# Patient Record
Sex: FEMALE | Race: WHITE | NOT HISPANIC OR LATINO | Employment: OTHER | URBAN - NONMETROPOLITAN AREA
[De-identification: names, ages, dates, MRNs, and addresses within clinical notes are randomized per-mention and may not be internally consistent; named-entity substitution may affect disease eponyms.]

---

## 2017-06-21 ENCOUNTER — COMMUNICATION - GICH (OUTPATIENT)
Dept: UROLOGY | Facility: OTHER | Age: 77
End: 2017-06-21

## 2017-07-19 ENCOUNTER — HISTORY (OUTPATIENT)
Dept: UROLOGY | Facility: OTHER | Age: 77
End: 2017-07-19

## 2017-07-19 ENCOUNTER — AMBULATORY - GICH (OUTPATIENT)
Dept: UROLOGY | Facility: OTHER | Age: 77
End: 2017-07-19

## 2017-07-19 DIAGNOSIS — Z85.51 PERSONAL HISTORY OF MALIGNANT NEOPLASM OF BLADDER: ICD-10-CM

## 2017-10-30 ENCOUNTER — OFFICE VISIT - GICH (OUTPATIENT)
Dept: FAMILY MEDICINE | Facility: OTHER | Age: 77
End: 2017-10-30

## 2017-10-30 ENCOUNTER — HISTORY (OUTPATIENT)
Dept: FAMILY MEDICINE | Facility: OTHER | Age: 77
End: 2017-10-30

## 2017-10-30 DIAGNOSIS — Z23 ENCOUNTER FOR IMMUNIZATION: ICD-10-CM

## 2017-10-30 DIAGNOSIS — Z12.31 ENCOUNTER FOR SCREENING MAMMOGRAM FOR MALIGNANT NEOPLASM OF BREAST: ICD-10-CM

## 2017-10-30 DIAGNOSIS — Z98.82 BREAST IMPLANT STATUS: ICD-10-CM

## 2017-10-30 DIAGNOSIS — Z79.899 OTHER LONG TERM (CURRENT) DRUG THERAPY: ICD-10-CM

## 2017-10-30 DIAGNOSIS — I10 ESSENTIAL (PRIMARY) HYPERTENSION: ICD-10-CM

## 2017-10-30 DIAGNOSIS — E78.00 PURE HYPERCHOLESTEROLEMIA: ICD-10-CM

## 2017-10-30 DIAGNOSIS — F41.8 OTHER SPECIFIED ANXIETY DISORDERS: ICD-10-CM

## 2017-10-30 LAB
A/G RATIO - HISTORICAL: 1.4 (ref 1–2)
ABSOLUTE BASOPHILS - HISTORICAL: 0 THOU/CU MM
ABSOLUTE EOSINOPHILS - HISTORICAL: 0.1 THOU/CU MM
ABSOLUTE IMMATURE GRANULOCYTES(METAS,MYELOS,PROS) - HISTORICAL: 0 THOU/CU MM
ABSOLUTE LYMPHOCYTES - HISTORICAL: 1.5 THOU/CU MM (ref 0.9–2.9)
ABSOLUTE MONOCYTES - HISTORICAL: 0.5 THOU/CU MM
ABSOLUTE NEUTROPHILS - HISTORICAL: 5.7 THOU/CU MM (ref 1.7–7)
ALBUMIN SERPL-MCNC: 4.3 G/DL (ref 3.5–5.7)
ALP SERPL-CCNC: 53 IU/L (ref 34–104)
ALT (SGPT) - HISTORICAL: 10 IU/L (ref 7–52)
ANION GAP - HISTORICAL: 9 (ref 5–18)
AST SERPL-CCNC: 18 IU/L (ref 13–39)
BASOPHILS # BLD AUTO: 0.3 %
BILIRUB SERPL-MCNC: 0.7 MG/DL (ref 0.3–1)
BUN SERPL-MCNC: 11 MG/DL (ref 7–25)
BUN/CREAT RATIO - HISTORICAL: 14
CALCIUM SERPL-MCNC: 9.5 MG/DL (ref 8.6–10.3)
CHLORIDE SERPLBLD-SCNC: 99 MMOL/L (ref 98–107)
CO2 SERPL-SCNC: 27 MMOL/L (ref 21–31)
CREAT SERPL-MCNC: 0.8 MG/DL (ref 0.7–1.3)
EOSINOPHIL NFR BLD AUTO: 1.4 %
ERYTHROCYTE [DISTWIDTH] IN BLOOD BY AUTOMATED COUNT: 14.2 % (ref 11.5–15.5)
GFR IF NOT AFRICAN AMERICAN - HISTORICAL: >60 ML/MIN/1.73M2
GLOBULIN - HISTORICAL: 3 G/DL (ref 2–3.7)
GLUCOSE SERPL-MCNC: 101 MG/DL (ref 70–105)
HCT VFR BLD AUTO: 46.3 % (ref 33–51)
HEMOGLOBIN: 15.1 G/DL (ref 12–16)
IMMATURE GRANULOCYTES(METAS,MYELOS,PROS) - HISTORICAL: 0.3 %
LYMPHOCYTES NFR BLD AUTO: 19.2 % (ref 20–44)
MCH RBC QN AUTO: 26.2 PG (ref 26–34)
MCHC RBC AUTO-ENTMCNC: 32.6 G/DL (ref 32–36)
MCV RBC AUTO: 80 FL (ref 80–100)
MONOCYTES NFR BLD AUTO: 6.7 %
NEUTROPHILS NFR BLD AUTO: 72.1 % (ref 42–72)
PLATELET # BLD AUTO: 191 THOU/CU MM (ref 140–440)
PMV BLD: 11.2 FL (ref 6.5–11)
POTASSIUM SERPL-SCNC: 4.4 MMOL/L (ref 3.5–5.1)
PROT SERPL-MCNC: 7.3 G/DL (ref 6.4–8.9)
RED BLOOD COUNT - HISTORICAL: 5.76 MIL/CU MM (ref 4–5.2)
SODIUM SERPL-SCNC: 135 MMOL/L (ref 133–143)
TSH - HISTORICAL: 2.68 UIU/ML (ref 0.34–5.6)
WHITE BLOOD COUNT - HISTORICAL: 7.9 THOU/CU MM (ref 4.5–11)

## 2017-12-28 NOTE — PATIENT INSTRUCTIONS
Patient Information     Patient Name MRN Annie Betancourt [id number] Female 1940      Patient Instructions by Roxane Terrell RN at 2017  1:00 PM     Author:  Roxane Terrell RN Service:  (none) Author Type:  NURS- Registered Nurse     Filed:  2017  1:14 PM Encounter Date:  2017 Status:  Signed     :  Roxane Terrell RN (NURS- Registered Nurse)            Home Care after Cystoscopy  Follow these guidelines for your care after your procedure.    Activity  No limitations    Bathing or showering  No limitations    Symptoms  You may notice some burning with urination but this usually resolves after 1-2 days.  You may also notice small amounts of blood in your urine.  Please increase water intake for the next few days to help with these symptoms.    Contacts  General Questions: [phone]  Appointments:  [phone]  Emergencies:  [phone]    When to call the clinic  If you develop any of the following symptoms please call the clinic immediately.  If the clinic is closed please be seen at an urgent care clinic or the Emergency Department.  - Burning with urination that worsens after 2 days  - Unable to urinate causing severe pelvic pain  - Fevers of greater than 101 degrees F  - Flank pain that is not responding to pain medication    Follow up  Please follow up as discussed at the appointment.

## 2017-12-28 NOTE — PROGRESS NOTES
Patient Information     Patient Name MRN Sex Annie Ram [id number] Female 1940      Progress Notes by Florentin Cordoba MD at 2017  1:00 PM     Author:  Florentin Cordoba MD Service:  (none) Author Type:  Physician     Filed:  2017  1:44 PM Encounter Date:  2017 Status:  Signed     :  Florentin Cordoba MD (Physician)            Preprocedure diagnosis  History of bladder cancer    Postprocedure diagnosis  History of bladder cancer    Procedure  Flexible Cystourethroscopy    Surgeon  Florentin Cordoba MD    Anesthesia  2% lidocaine jelly intraurethrally    Complications  None    Indications  76 y.o. female undergoing a flexible cystoscopy for the above mentioned indications.    Findings  Cystoscopic findings included a normal urethra.    The bladder appeared to be normal capacity.    There were no tumors, stones or foreign bodies.    The orifices were slit-shaped and in their normal location.    Procedure  The patient was placed in supine position and prepped and draped in sterile fashion with lidocaine jelly per urethra for anesthesia.    I passed a lubricated 14F flexible cystoscope through the urethra and into the bladder and the bladder was completely visualized.  The cystoscope was retroflexed and the bladder neck visualized.    The cystoscope was slowly withdrawn while visualizing the urethra and the procedure terminated.    The patient tolerated the procedure well.      Pathology  I personally reviewed the pathology report  2015  low grade Ta (muscularis propria was not present in specimen)    Plan  Follow up surveillance cystoscopy in 6 months then once annually

## 2017-12-28 NOTE — TELEPHONE ENCOUNTER
Patient Information     Patient Name MRN Annie Betancourt [id number] Female 1940      Telephone Encounter by Roxane Terrell RN at 2017 12:54 PM     Author:  Roxane Terrell RN Service:  (none) Author Type:  NURS- Registered Nurse     Filed:  2017 12:55 PM Encounter Date:  2017 Status:  Signed     :  Roxane Terrell RN (NURS- Registered Nurse)            Patient states she wasn't sure who called her.  I am thinking she may have got a reminder call.  Patient states that she is exhausted and cannot come to her 1:00 appointment for her cystoscopy.  Patient transferred to the Urology  to reschedule.  Roxane Terrell RN.........2017...12:55 PM

## 2017-12-29 NOTE — PATIENT INSTRUCTIONS
Patient Information     Patient Name MRN Annie Betancourt [id number] Female 1940      Patient Instructions by Rosalina Clancy MD at 10/30/2017  2:09 PM     Author:  Rosalina Clancy MD  Service:  (none) Author Type:  Physician     Filed:  10/30/2017  2:10 PM  Encounter Date:  10/30/2017 Status:  Addendum     :  Rosalina Clancy MD (Physician)        Related Notes: Original Note by Rosalina Clancy MD (Physician) filed at 10/30/2017  2:09 PM            [employer]: Tips and Coping Skills for Quitting Smoking     Getting Started           Make a list of reasons for quitting.           Think positively.  -   Believe you can.  -   Remind yourself,  I don t do that anymore.   -   Tell yourself often:  I can do this.   -   Visualize yourself as someone who doesn t use tobacco.           Use relaxation breathing.  -   Inhale to count of eight.  -   Hold to count of four.  -   Exhale to count of eight.           Substitute items for cigarettes.  -   Chew gum.  -   Suck on hard candy.  -   Chew on straws or toothpicks.  -   Eat low-calorie snacks.           Keep your hands busy.  -   Play cards.  -   Read books.  -   Put together puzzles.  -   Play with rubber binders.  -   Make crafts.  -   Write letters.  -   Draw.  -   Paint.           Concentrate on the good things in your life!           Change your environment:  -   Change your routine to help avoid temptation. Even small changes can lower the craving to smoke.  -   Get rid of all cigarettes, ashtrays and lighters in your home, car, desk or office.  -   Change your favorite smoking areas to make them remind you less of smoking.  -   Make your home and vehicles smoke free.           Get support from others:  -   Talk to your family, friends or coworkers about how to support you while you quit.  -   See if others you know would like to quit with you. This way you can support each other through the tougher times of quitting.           Plan  your reward for each day you don t smoke.           Remember that even the most intense craving lasts only 5 to 10 minutes. Wait it out. Tell yourself,  This too shall pass.   Avoiding a Relapse           Have a plan for how you will deal with unexpected urges. (Take a walk, make a call.)           Think your way through difficult situations ahead of time whenever you can.           Think about past quitting attempts and what was helpful to you. Reuse them again if possible or try something new.           Explore ways to move your body with safe and realistic expectations. Increasing your physical activity can help you manage weight gain and work through emotions that otherwise would make you want to smoke.           Avoid foods high in calories and fat. Sugar can increase cravings to smoke.  Limit large amounts of sugar.           Drink lots of water. Ice water may be helpful in getting rid of a craving.           Reward yourself when you reach milestones: 1 day, 1 week, 2 weeks, 1 month, etc.           Go to places where you cannot smoke -- stay away from the places you used to smoke.           Think about the money you saved!           Think of quitting as an act of love -- for those you care about and for yourself!  Quitting Smoking  For help to quit smoking:           Call [employer] Class Registration at [phone] or visit [email or web address] to learn about support groups.           Call [employer] at [phone] or visit [email or web address].       2016 [employer]. TM - A TRADEMARK OF [employer]  OTHER TRADEMARKS USED ARE OWNED BY THEIR RESPECTIVE OWNERS  THIS FACT SHEET DOES NOT REPLACE MEDICAL OR PROFESSIONAL ADVICE; IT IS ONLY A GUIDE.  [id number] (2/16)

## 2017-12-30 NOTE — NURSING NOTE
Patient Information     Patient Name MRN Annie Betancourt [id number] Female 1940      Nursing Note by Roxane Terrell RN at 2017  1:00 PM     Author:  Roxane Terrell RN Service:  (none) Author Type:  NURS- Registered Nurse     Filed:  2017  1:36 PM Encounter Date:  2017 Status:  Signed     :  Roxane Terrell RN (NURS- Registered Nurse)            Patient positioned in frog leg position prior to Florentin Cordoba MD prepping patient with chlorhexidine gluconate cleanser.    [place] Protocol    A. Pre-procedure verification complete yes  1-relevant information / documentation available, reviewed and properly matched to the patient; 2-consent accurate and complete, 3-equipment and supplies available    B. Site marking complete N/A  Site marked if not in continuous attendance with patient    C. TIME OUT completed yes  Time Out was conducted just prior to starting procedure to verify the eight required elements: 1-patient identity, 2-consent accurate and complete, 3-position, 4-correct side/site marked (if applicable), 5-procedure, 6-relevant images / results properly labeled and displayed (if applicable), 7-antibiotics / irrigation fluids (if applicable), 8-safety precautions.    After procedure perineum area rinsed. Discharge instructions reviewed with patient. Patient verbalized understanding of discharge instructions and discharged ambulatory.

## 2018-01-23 ENCOUNTER — DOCUMENTATION ONLY (OUTPATIENT)
Dept: FAMILY MEDICINE | Facility: OTHER | Age: 78
End: 2018-01-23

## 2018-01-23 PROBLEM — Z98.82 H/O BREAST IMPLANT: Status: ACTIVE | Noted: 2017-10-30

## 2018-01-23 PROBLEM — F41.1 GENERALIZED ANXIETY DISORDER: Status: ACTIVE | Noted: 2018-01-23

## 2018-01-23 RX ORDER — HYDROCHLOROTHIAZIDE 12.5 MG/1
12.5 CAPSULE ORAL DAILY
COMMUNITY
Start: 2017-10-30 | End: 2019-01-15

## 2018-01-23 RX ORDER — ASPIRIN 81 MG/1
81 TABLET ORAL
COMMUNITY
Start: 2012-10-29

## 2018-01-23 RX ORDER — METOPROLOL TARTRATE 50 MG
TABLET ORAL
COMMUNITY
Start: 2017-10-30 | End: 2018-12-22

## 2018-01-23 RX ORDER — LISINOPRIL 20 MG/1
20 TABLET ORAL DAILY
COMMUNITY
Start: 2017-10-30 | End: 2018-04-04 | Stop reason: DRUGHIGH

## 2018-01-23 RX ORDER — ESCITALOPRAM OXALATE 10 MG/1
10 TABLET ORAL DAILY
COMMUNITY
Start: 2017-10-30 | End: 2019-01-10

## 2018-01-23 RX ORDER — SIMVASTATIN 20 MG
20 TABLET ORAL AT BEDTIME
COMMUNITY
Start: 2017-10-30 | End: 2018-12-29

## 2018-01-26 VITALS
HEART RATE: 62 BPM | SYSTOLIC BLOOD PRESSURE: 179 MMHG | WEIGHT: 163.6 LBS | DIASTOLIC BLOOD PRESSURE: 103 MMHG | BODY MASS INDEX: 26.29 KG/M2 | HEIGHT: 66 IN

## 2018-01-26 VITALS — HEART RATE: 64 BPM | BODY MASS INDEX: 26.1 KG/M2 | WEIGHT: 162.4 LBS | HEIGHT: 66 IN | RESPIRATION RATE: 16 BRPM

## 2018-01-29 ENCOUNTER — HISTORY (OUTPATIENT)
Dept: UROLOGY | Facility: OTHER | Age: 78
End: 2018-01-29

## 2018-01-29 ENCOUNTER — AMBULATORY - GICH (OUTPATIENT)
Dept: UROLOGY | Facility: OTHER | Age: 78
End: 2018-01-29

## 2018-01-29 DIAGNOSIS — Z85.51 PERSONAL HISTORY OF MALIGNANT NEOPLASM OF BLADDER: ICD-10-CM

## 2018-02-09 VITALS — WEIGHT: 165.6 LBS | RESPIRATION RATE: 16 BRPM | HEART RATE: 60 BPM | BODY MASS INDEX: 27.59 KG/M2 | HEIGHT: 65 IN

## 2018-02-13 NOTE — NURSING NOTE
Patient Information     Patient Name MRN Sex Annie Ram [id number] Female 1940      Nursing Note by Yasmine Rosen RN at 2018  2:00 PM     Author:  Yasmine Rosen RN Service:  (none) Author Type:  NURS- Registered Nurse     Filed:  2018  2:18 PM Encounter Date:  2018 Status:  Signed     :  Yasmine Rosen RN (NURS- Registered Nurse)            Patient positioned in frog leg position prior to Florentin Cordoba MD prepping patient with chlorhexidine gluconate cleanser.    [place] Protocol    A. Pre-procedure verification complete yes  1-relevant information / documentation available, reviewed and properly matched to the patient; 2-consent accurate and complete, 3-equipment and supplies available    B. Site marking complete N/A  Site marked if not in continuous attendance with patient    C. TIME OUT completed yes  Time Out was conducted just prior to starting procedure to verify the eight required elements: 1-patient identity, 2-consent accurate and complete, 3-position, 4-correct side/site marked (if applicable), 5-procedure, 6-relevant images / results properly labeled and displayed (if applicable), 7-antibiotics / irrigation fluids (if applicable), 8-safety precautions.    After procedure perineum area rinsed. Discharge instructions reviewed with patient. Patient verbalized understanding of discharge instructions and discharged ambulatory.

## 2018-02-13 NOTE — PATIENT INSTRUCTIONS
Patient Information     Patient Name MRN Annie Betancourt [id number] Female 1940      Patient Instructions by Yasmine Rosen RN at 2018  2:00 PM     Author:  Yasmine Rosen RN Service:  (none) Author Type:  NURS- Registered Nurse     Filed:  2018  2:18 PM Encounter Date:  2018 Status:  Signed     :  Yasmine Rosen RN (NURS- Registered Nurse)            Home Care after Cystoscopy  Follow these guidelines for your care after your procedure.    Activity  No limitations    Bathing or showering  No limitations    Symptoms  You may notice some burning with urination but this usually resolves after 1-2 days.  You may also notice small amounts of blood in your urine.  Please increase water intake for the next few days to help with these symptoms.    Contacts  General Questions: [phone]  Appointments:  [phone]  Emergencies:  911    When to call the clinic  If you develop any of the following symptoms please call the clinic immediately.  If the clinic is closed please be seen at an urgent care clinic or the Emergency Department.  - Burning with urination that worsens after 2 days  - Unable to urinate causing severe pelvic pain  - Fevers of greater than 101 degrees F  - Flank pain that is not responding to pain medication    Follow up  Please follow up as discussed at the appointment.

## 2018-04-03 ENCOUNTER — NURSE TRIAGE (OUTPATIENT)
Dept: FAMILY MEDICINE | Facility: OTHER | Age: 78
End: 2018-04-03

## 2018-04-03 NOTE — TELEPHONE ENCOUNTER
"Pt calls today regarding increased BP.  Pt states BP was taken this morning and it read 179/99 and 155/93.  Pt complains of light headedness and a bit of dizziness.  Pt denies any chest pain or difficulty breathing.    Per face to face with  pt is to check bp again tonight.    I instructed pt to call back in the morning with readings.  We scheduled an appointment tomorrow with .  Pt was instructed to call back with worsening symptoms or call 911 if chest pain or difficulty breathing occurred.  Pt stated understanding and intent to comply.  Will route to self to follow up in AM.  Anusha Velez RN.............................4/3/2018 12:39 PM    Reason for Disposition    BP  >= 180/110    Answer Assessment - Initial Assessment Questions  1. BLOOD PRESSURE: \"What is the blood pressure?\" \"Did you take at least two measurements 5 minutes apart?\"      179/99 155/93  2. ONSET: \"When did you take your blood pressure?\"      This morning  3. HOW: \"How did you obtain the blood pressure?\" (e.g., visiting nurse, automatic home BP monitor)      Automatic from home  4. HISTORY: \"Do you have a history of high blood pressure?\"      Have had the last couple yeras  5. MEDICATIONS: \"Are you taking any medications for blood pressure?\" \"Have you missed any doses recently?\"      No missed doeses  6. OTHER SYMPTOMS: \"Do you have any symptoms?\" (e.g., headache, chest pain, blurred vision, difficulty breathing, weakness)      Light headed, dizzy, a little bit of weakness  7. PREGNANCY: \"Is there any chance you are pregnant?\" \"When was your last menstrual period?\"      NA    Protocols used: HIGH BLOOD PRESSURE-ADULT-AH    "

## 2018-04-04 ENCOUNTER — OFFICE VISIT (OUTPATIENT)
Dept: FAMILY MEDICINE | Facility: OTHER | Age: 78
End: 2018-04-04
Attending: FAMILY MEDICINE
Payer: COMMERCIAL

## 2018-04-04 VITALS
DIASTOLIC BLOOD PRESSURE: 92 MMHG | BODY MASS INDEX: 27.62 KG/M2 | SYSTOLIC BLOOD PRESSURE: 169 MMHG | WEIGHT: 166 LBS | HEART RATE: 49 BPM

## 2018-04-04 DIAGNOSIS — I10 ESSENTIAL HYPERTENSION: Primary | ICD-10-CM

## 2018-04-04 PROCEDURE — G0463 HOSPITAL OUTPT CLINIC VISIT: HCPCS

## 2018-04-04 PROCEDURE — 99213 OFFICE O/P EST LOW 20 MIN: CPT | Performed by: FAMILY MEDICINE

## 2018-04-04 RX ORDER — LISINOPRIL 40 MG/1
40 TABLET ORAL DAILY
Qty: 90 TABLET | Refills: 3 | Status: SHIPPED | OUTPATIENT
Start: 2018-04-04 | End: 2019-01-01

## 2018-04-04 NOTE — PROGRESS NOTES
"Subjective:   Annie Joy is a 77 year old female with hypertension and had not been taking Blood pressure regularly and then yesterday felt \"dizzy\" so checked and it was high so called for an appointment. Still smoking 1 PPD. Hardly leaves the house in the winter.       Current Outpatient Prescriptions   Medication Sig Dispense Refill     lisinopril (PRINIVIL/ZESTRIL) 40 MG tablet Take 1 tablet (40 mg) by mouth daily 90 tablet 3     aspirin EC 81 MG EC tablet Take 81 mg by mouth daily with food       CALCIUM PO Take 600 mg by mouth daily       escitalopram (LEXAPRO) 10 MG tablet Take 10 mg by mouth daily       hydrochlorothiazide (MICROZIDE) 12.5 MG capsule Take 12.5 mg by mouth daily       metoprolol tartrate (LOPRESSOR) 50 MG tablet Take 2 tablets in am and 2 in the evening.       simvastatin (ZOCOR) 20 MG tablet Take 20 mg by mouth At Bedtime       [DISCONTINUED] lisinopril (PRINIVIL/ZESTRIL) 20 MG tablet Take 20 mg by mouth daily        Social History     Social History     Marital status:      Spouse name: Pardeep     Number of children: N/A     Years of education: N/A     Occupational History     Not on file.     Social History Main Topics     Smoking status: Current Every Day Smoker     Packs/day: 1.00     Years: 60.00     Types: Cigarettes     Smokeless tobacco: Never Used     Alcohol use No     Drug use: Not on file      Comment: Drug use: No     Sexual activity: Not Currently     Other Topics Concern     Not on file     Social History Narrative     for 50 years 2011  Children: Jasvir Najera  1957, Soraida De La Rosa, Edie Vieira, 1961, [place](adopted out and then found her 2002), Ambrosio Joy 1964, rizwana Ta, Enoc Joy  1966, [facility] long term for drug sale and possession      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        Enjoys visiting, photography, painting, writing.    Lives with her . He has had stroke but still drives.       Hypertension ROS: taking " medications as instructed, no medication side effects noted, no TIA's, no chest pain on exertion, no dyspnea on exertion, no swelling of ankles.   New concerns: See above.     Objective:   BP (!) 169/92 (BP Location: Right arm, Patient Position: Sitting)  Pulse (!) 49  Wt 166 lb (75.3 kg)  BMI 27.62 kg/m2   Appearance healthy, alert and cooperative.  Marked stigmata of her chronic tobacco use with wrinkling skin and gravelly voice.  She smells of cigarettes.  General exam BP noted to be moderately elevated today in office, S1, S2 normal, no gallop, no murmur, chest clear, no JVD, no HSM, no edema.   Lab review: labs are reviewed, up to date and normal.     Assessment:    Hypertension needs improvement.     Plan:   Reviewed all meds as she seem to have some confusion about the names of medications and what they were for.  I printed out her med list and we reviewed all of these individually.  Increased lisinopril to 40 mg daily.  We will plan to see her back in follow-up if blood pressures are not coming under control at home.  She does have a cuff and does check it intermittently.  Increased activity and try to get out and walk once the weather gets better since she has been very sedentary.  She also has been a little bit more liberal in salt intake recently.  Rosalina Clancy MD  4:06 PM 4/4/2018   Portions of this dictation were created using the Dragon Nuance voice recognition system. Proofreading was completed but there may be errors in text.

## 2018-04-04 NOTE — NURSING NOTE
Patient presents to clinic with high blood pressure, has been checking at home.  Shelby Koch LPN ...... 4/4/2018 3:03 PM

## 2018-04-04 NOTE — MR AVS SNAPSHOT
"              After Visit Summary   4/4/2018    Annie Joy    MRN: [id number]           Patient Information     Date Of Birth          1940        Visit Information        Provider Department      4/4/2018 3:00 PM Rosalina Clancy MD [facility]        Today's Diagnoses     Essential hypertension    -  1      Care Instructions      Step-by-Step  Checking Your Blood Pressure    Date Last Reviewed: 4/27/2016 2000-2017 [employer]. [address] [place] [address]. All rights reserved. This information is not intended as a substitute for professional medical care. Always follow your healthcare professional's instructions.                Follow-ups after your visit        Who to contact     If you have questions or need follow up information about today's clinic visit or your schedule please contact [facility] directly at [phone].  Normal or non-critical lab and imaging results will be communicated to you by MyChart, letter or phone within 4 business days after the clinic has received the results. If you do not hear from us within 7 days, please contact the clinic through [employer] or phone. If you have a critical or abnormal lab result, we will notify you by phone as soon as possible.  Submit refill requests through [employer] or call your pharmacy and they will forward the refill request to us. Please allow 3 business days for your refill to be completed.          Additional Information About Your Visit        MyChart Information     [employer] lets you send messages to your doctor, view your test results, renew your prescriptions, schedule appointments and more. To sign up, go to [email or web address] . Click on \"Log in\" on the left side of the screen, which will take you to the Welcome page. Then click on \"Sign up Now\" on the right side of the page.     You will be asked to enter the access code listed below, as well as some personal " information. Please follow the directions to create your username and password.     Your access code is: [email or web address]  Expires: 7/3/2018  3:26 PM     Your access code will  in 90 days. If you need help or a new code, please call your [place] clinic or [phone].        Care EveryWhere ID     This is your Care EveryWhere ID. This could be used by other organizations to access your [place] medical records  [id number]        Your Vitals Were     Pulse BMI (Body Mass Index)                58 27.62 kg/m2           Blood Pressure from Last 3 Encounters:   18 (!) 190/112   10/30/17 (!) 179/103   16 154/68    Weight from Last 3 Encounters:   18 166 lb (75.3 kg)   18 165 lb 9.6 oz (75.1 kg)   10/30/17 163 lb 9.6 oz (74.2 kg)              Today, you had the following     No orders found for display         Today's Medication Changes          These changes are accurate as of 18  3:26 PM.  If you have any questions, ask your nurse or doctor.               These medicines have changed or have updated prescriptions.        Dose/Directions    lisinopril 40 MG tablet   Commonly known as:  PRINIVIL/ZESTRIL   This may have changed:    - medication strength  - how much to take   Used for:  Essential hypertension   Changed by:  Rosalina Clancy MD        Dose:  40 mg   Take 1 tablet (40 mg) by mouth daily   Quantity:  90 tablet   Refills:  3            Where to get your medicines      These medications were sent to [facility] [address] [address]     Phone:  [phone]     lisinopril 40 MG tablet                Primary Care Provider Office Phone # Fax #    Rosalina Clancy [phone]       [address] [facility] [address]        Equal Access to Services     LALITO DE LA CRUZ AH: Robby quiroz Soraghav, waaxda luqadaha, qaybta kaalwilder pierce, jocelynn jha  laanamaria abel. So [facility] [phone].    ATENCIÓN: Si habla miguel a, tiene a franks disposición servicios gratuitos de asistencia lingüística. Tom al [phone].    We comply with applicable federal civil rights laws and [place] laws. We do not discriminate on the basis of race, color, national origin, age, disability, sex, sexual orientation, or gender identity.            Thank you!     Thank you for choosing [facility] AND [facility]  for your care. Our goal is always to provide you with excellent care. Hearing back from our patients is one way we can continue to improve our services. Please take a few minutes to complete the written survey that you may receive in the mail after your visit with us. Thank you!             Your Updated Medication List - Protect others around you: Learn how to safely use, store and throw away your medicines at www.disposemymeds.org.          This list is accurate as of 4/4/18  3:26 PM.  Always use your most recent med list.                   Brand Name Dispense Instructions for use Diagnosis    aspirin EC 81 MG EC tablet      Take 81 mg by mouth daily with food        CALCIUM PO      Take 600 mg by mouth daily        escitalopram 10 MG tablet    LEXAPRO     Take 10 mg by mouth daily        hydrochlorothiazide 12.5 MG capsule    MICROZIDE     Take 12.5 mg by mouth daily        lisinopril 40 MG tablet    PRINIVIL/ZESTRIL    90 tablet    Take 1 tablet (40 mg) by mouth daily    Essential hypertension       metoprolol tartrate 50 MG tablet    LOPRESSOR     Take 2 tablets in am and 2 in the evening.        simvastatin 20 MG tablet    ZOCOR     Take 20 mg by mouth At Bedtime

## 2018-04-04 NOTE — PATIENT INSTRUCTIONS
Step-by-Step  Checking Your Blood Pressure    Date Last Reviewed: 4/27/2016 2000-2017 [employer]. [address], [place] [address]. All rights reserved. This information is not intended as a substitute for professional medical care. Always follow your healthcare professional's instructions.

## 2018-07-24 NOTE — PROGRESS NOTES
Patient Information     Patient Name  Annie Joy MRN  [id number] Sex  Female   1940      Letter by Rosalina Clancy MD at      Author:  Rosalina Clancy MD Service:  (none) Author Type:  (none)    Filed:   Encounter Date:  10/30/2017 Status:  (Other)           Annie Joy  [address] [facility] [address]          2017    Dear Ms. Joy:    Following are the tests completed during your last clinic visit.  The results of these tests are normal and require no further attention.  Results for orders placed or performed in visit on 10/30/17      COMPLETE METABOLIC PANEL      Result  Value Ref Range    SODIUM 135 133 - 143 mmol/L    POTASSIUM 4.4 3.5 - 5.1 mmol/L    CHLORIDE 99 98 - 107 mmol/L    CO2,TOTAL 27 21 - 31 mmol/L    ANION GAP 9 5 - 18                    GLUCOSE 101 70 - 105 mg/dL    CALCIUM 9.5 8.6 - 10.3 mg/dL    BUN 11 7 - 25 mg/dL    CREATININE 0.80 0.70 - 1.30 mg/dL    BUN/CREAT RATIO           14                    GFR if African American >60 >60 ml/min/1.73m2    GFR if not African American >60 >60 ml/min/1.73m2    ALBUMIN 4.3 3.5 - 5.7 g/dL    PROTEIN,TOTAL 7.3 6.4 - 8.9 g/dL    GLOBULIN                  3.0 2.0 - 3.7 g/dL    A/G RATIO 1.4 1.0 - 2.0                    BILIRUBIN,TOTAL 0.7 0.3 - 1.0 mg/dL    ALK PHOSPHATASE 53 34 - 104 IU/L    ALT (SGPT) 10 7 - 52 IU/L    AST (SGOT) 18 13 - 39 IU/L   TSH      Result  Value Ref Range    TSH 2.68 0.34 - 5.60 uIU/mL   CBC WITH AUTO DIFFERENTIAL      Result  Value Ref Range    WHITE BLOOD COUNT         7.9 4.5 - 11.0 thou/cu mm    RED BLOOD COUNT           5.76 (H) 4.00 - 5.20 mil/cu mm    HEMOGLOBIN                15.1 12.0 - 16.0 g/dL    HEMATOCRIT                46.3 33.0 - 51.0 %    MCV                       80 80 - 100 fL    MCH                       26.2 26.0 - 34.0 pg    MCHC                      32.6 32.0 - 36.0 g/dL    RDW                       14.2 11.5 - 15.5 %    PLATELET COUNT            191 140 - 440 thou/cu  mm    MPV                       11.2 (H) 6.5 - 11.0 fL    NEUTROPHILS               72.1 (H) 42.0 - 72.0 %    LYMPHOCYTES               19.2 (L) 20.0 - 44.0 %    MONOCYTES                 6.7 <12.0 %    EOSINOPHILS               1.4 <8.0 %    BASOPHILS                 0.3 <3.0 %    IMMATURE GRANULOCYTES(METAS,MYELOS,PROS) 0.3 %    ABSOLUTE NEUTROPHILS      5.7 1.7 - 7.0 thou/cu mm    ABSOLUTE LYMPHOCYTES      1.5 0.9 - 2.9 thou/cu mm    ABSOLUTE MONOCYTES        0.5 <0.9 thou/cu mm    ABSOLUTE EOSINOPHILS      0.1 <0.5 thou/cu mm    ABSOLUTE BASOPHILS        0.0 <0.3 thou/cu mm    ABSOLUTE IMMATURE GRANULOCYTES(METAS,MYELOS,PROS) 0.0 <=0.3 thou/cu mm     If you have any further questions or problems contact my office at  [phone].  Sincerely,    Rosalina Clancy MD  9:44 AM 10/31/2017

## 2018-12-29 DIAGNOSIS — E78.00 PURE HYPERCHOLESTEROLEMIA: Primary | ICD-10-CM

## 2019-01-01 ENCOUNTER — OFFICE VISIT (OUTPATIENT)
Dept: FAMILY MEDICINE | Facility: OTHER | Age: 79
End: 2019-01-01
Attending: FAMILY MEDICINE
Payer: COMMERCIAL

## 2019-01-01 ENCOUNTER — OFFICE VISIT (OUTPATIENT)
Dept: UROLOGY | Facility: OTHER | Age: 79
End: 2019-01-01
Attending: UROLOGY
Payer: MEDICARE

## 2019-01-01 VITALS
HEIGHT: 67 IN | BODY MASS INDEX: 21.56 KG/M2 | TEMPERATURE: 98.1 F | RESPIRATION RATE: 12 BRPM | HEART RATE: 56 BPM | WEIGHT: 137.4 LBS | DIASTOLIC BLOOD PRESSURE: 80 MMHG | SYSTOLIC BLOOD PRESSURE: 142 MMHG

## 2019-01-01 VITALS — HEART RATE: 64 BPM | WEIGHT: 142.4 LBS | BODY MASS INDEX: 23.7 KG/M2 | RESPIRATION RATE: 12 BRPM

## 2019-01-01 DIAGNOSIS — I10 ESSENTIAL HYPERTENSION: ICD-10-CM

## 2019-01-01 DIAGNOSIS — E78.00 PURE HYPERCHOLESTEROLEMIA: ICD-10-CM

## 2019-01-01 DIAGNOSIS — I10 ESSENTIAL HYPERTENSION: Primary | ICD-10-CM

## 2019-01-01 DIAGNOSIS — Z85.51 HISTORY OF BLADDER CANCER: Primary | ICD-10-CM

## 2019-01-01 DIAGNOSIS — Z79.899 MEDICATION MANAGEMENT: Primary | ICD-10-CM

## 2019-01-01 DIAGNOSIS — F41.8 DEPRESSION WITH ANXIETY: ICD-10-CM

## 2019-01-01 LAB
ALBUMIN SERPL-MCNC: 4.7 G/DL (ref 3.5–5.7)
ALP SERPL-CCNC: 57 U/L (ref 34–104)
ALT SERPL W P-5'-P-CCNC: 8 U/L (ref 7–52)
ANION GAP SERPL CALCULATED.3IONS-SCNC: 8 MMOL/L (ref 3–14)
AST SERPL W P-5'-P-CCNC: 19 U/L (ref 13–39)
BASOPHILS # BLD AUTO: 0 10E9/L (ref 0–0.2)
BASOPHILS NFR BLD AUTO: 0.4 %
BILIRUB SERPL-MCNC: 0.9 MG/DL (ref 0.3–1)
BUN SERPL-MCNC: 7 MG/DL (ref 7–25)
CALCIUM SERPL-MCNC: 9.6 MG/DL (ref 8.6–10.3)
CHLORIDE SERPL-SCNC: 94 MMOL/L (ref 98–107)
CO2 SERPL-SCNC: 29 MMOL/L (ref 21–31)
CREAT SERPL-MCNC: 0.78 MG/DL (ref 0.6–1.2)
DIFFERENTIAL METHOD BLD: ABNORMAL
EOSINOPHIL # BLD AUTO: 0 10E9/L (ref 0–0.7)
EOSINOPHIL NFR BLD AUTO: 0.6 %
ERYTHROCYTE [DISTWIDTH] IN BLOOD BY AUTOMATED COUNT: 13.8 % (ref 10–15)
GFR SERPL CREATININE-BSD FRML MDRD: 71 ML/MIN/{1.73_M2}
GLUCOSE SERPL-MCNC: 116 MG/DL (ref 70–105)
HCT VFR BLD AUTO: 46 % (ref 35–47)
HGB BLD-MCNC: 15.1 G/DL (ref 11.7–15.7)
IMM GRANULOCYTES # BLD: 0 10E9/L (ref 0–0.4)
IMM GRANULOCYTES NFR BLD: 0.3 %
LYMPHOCYTES # BLD AUTO: 1.3 10E9/L (ref 0.8–5.3)
LYMPHOCYTES NFR BLD AUTO: 19.2 %
MCH RBC QN AUTO: 26.3 PG (ref 26.5–33)
MCHC RBC AUTO-ENTMCNC: 32.8 G/DL (ref 31.5–36.5)
MCV RBC AUTO: 80 FL (ref 78–100)
MONOCYTES # BLD AUTO: 0.5 10E9/L (ref 0–1.3)
MONOCYTES NFR BLD AUTO: 7.9 %
NEUTROPHILS # BLD AUTO: 4.9 10E9/L (ref 1.6–8.3)
NEUTROPHILS NFR BLD AUTO: 71.6 %
PLATELET # BLD AUTO: 184 10E9/L (ref 150–450)
POTASSIUM SERPL-SCNC: 3.5 MMOL/L (ref 3.5–5.1)
PROT SERPL-MCNC: 7 G/DL (ref 6.4–8.9)
RBC # BLD AUTO: 5.74 10E12/L (ref 3.8–5.2)
SODIUM SERPL-SCNC: 131 MMOL/L (ref 134–144)
WBC # BLD AUTO: 6.8 10E9/L (ref 4–11)

## 2019-01-01 PROCEDURE — 36415 COLL VENOUS BLD VENIPUNCTURE: CPT | Mod: ZL | Performed by: FAMILY MEDICINE

## 2019-01-01 PROCEDURE — 80053 COMPREHEN METABOLIC PANEL: CPT | Mod: ZL | Performed by: FAMILY MEDICINE

## 2019-01-01 PROCEDURE — 85025 COMPLETE CBC W/AUTO DIFF WBC: CPT | Mod: ZL | Performed by: FAMILY MEDICINE

## 2019-01-01 PROCEDURE — G0463 HOSPITAL OUTPT CLINIC VISIT: HCPCS | Mod: 25

## 2019-01-01 PROCEDURE — G0463 HOSPITAL OUTPT CLINIC VISIT: HCPCS

## 2019-01-01 PROCEDURE — 52000 CYSTOURETHROSCOPY: CPT | Performed by: UROLOGY

## 2019-01-01 PROCEDURE — G0439 PPPS, SUBSEQ VISIT: HCPCS | Performed by: FAMILY MEDICINE

## 2019-01-01 RX ORDER — SIMVASTATIN 20 MG
20 TABLET ORAL AT BEDTIME
Qty: 90 TABLET | Refills: 4 | Status: SHIPPED | OUTPATIENT
Start: 2019-01-01

## 2019-01-01 RX ORDER — METOPROLOL TARTRATE 50 MG
TABLET ORAL
Refills: 0 | OUTPATIENT
Start: 2019-01-01

## 2019-01-01 RX ORDER — LISINOPRIL 40 MG/1
TABLET ORAL
Qty: 90 TABLET | Refills: 0 | Status: SHIPPED | OUTPATIENT
Start: 2019-01-01 | End: 2019-01-01

## 2019-01-01 RX ORDER — ESCITALOPRAM OXALATE 10 MG/1
10 TABLET ORAL DAILY
Qty: 90 TABLET | Refills: 4 | Status: SHIPPED | OUTPATIENT
Start: 2019-01-01

## 2019-01-01 RX ORDER — SIMVASTATIN 20 MG
TABLET ORAL
Qty: 30 TABLET | Refills: 0 | Status: SHIPPED | OUTPATIENT
Start: 2019-01-01 | End: 2019-01-01

## 2019-01-01 RX ORDER — HYDROCHLOROTHIAZIDE 12.5 MG/1
CAPSULE ORAL
Qty: 90 CAPSULE | Refills: 4 | Status: SHIPPED | OUTPATIENT
Start: 2019-01-01

## 2019-01-01 RX ORDER — METOPROLOL TARTRATE 100 MG
100 TABLET ORAL 2 TIMES DAILY
Qty: 180 TABLET | Refills: 3 | Status: ON HOLD | OUTPATIENT
Start: 2019-01-01 | End: 2020-01-01

## 2019-01-01 RX ORDER — LISINOPRIL 40 MG/1
TABLET ORAL
Qty: 30 TABLET | Refills: 0 | Status: SHIPPED | OUTPATIENT
Start: 2019-01-01 | End: 2019-01-01

## 2019-01-01 RX ORDER — LISINOPRIL 40 MG/1
40 TABLET ORAL DAILY
Qty: 90 TABLET | Refills: 4 | Status: SHIPPED | OUTPATIENT
Start: 2019-01-01

## 2019-01-01 RX ORDER — SIMVASTATIN 20 MG
TABLET ORAL
Qty: 90 TABLET | Refills: 0 | Status: SHIPPED | OUTPATIENT
Start: 2019-01-01 | End: 2019-01-01

## 2019-01-01 ASSESSMENT — MIFFLIN-ST. JEOR: SCORE: 1130.87

## 2019-01-01 ASSESSMENT — PAIN SCALES - GENERAL: PAINLEVEL: NO PAIN (0)

## 2019-01-03 RX ORDER — SIMVASTATIN 20 MG
TABLET ORAL
Qty: 90 TABLET | Refills: 0 | Status: SHIPPED | OUTPATIENT
Start: 2019-01-03 | End: 2019-01-01

## 2019-01-03 NOTE — TELEPHONE ENCOUNTER
"Refill request from [facility] for:  simvastatin (ZOCOR) 20 MG tablet      LOV with PCP 4/4/2018  Medications reviewed and no changes indicated to requested medication as per visit note. Lab result letter out 3/18/2016 also indicated no changes to requested medication.    Will refill for 90 days    Requested Prescriptions   Pending Prescriptions Disp Refills     simvastatin (ZOCOR) 20 MG tablet [Pharmacy Med Name: SIMVASTATIN 20MG  TAB] 90 tablet 4     Sig: TAKE ONE TABLET BY MOUTH AT BEDTIME    Statins Protocol Failed - 12/29/2018 12:15 PM       Failed - LDL on file in past 12 months    Recent Labs   Lab Test 03/18/16  1444   *            Passed - No abnormal creatine kinase in past 12 months    No lab results found.            Passed - Recent (12 mo) or future (30 days) visit within the authorizing provider's specialty    Patient had office visit in the last 12 months or has a visit in the next 30 days with authorizing provider or within the authorizing provider's specialty.  See \"Patient Info\" tab in inbasket, or \"Choose Columns\" in Meds & Orders section of the refill encounter.             Passed - Patient is age 18 or older       Passed - No active pregnancy on record       Passed - No positive pregnancy test in past 12 months      Medication Detail      Disp Refills Start End MIGUELANGEL   simvastatin (ZOCOR) 20 mg tablet 90 tablet 4 10/30/2017  No   Sig: Take 1 tablet by mouth at bedtime.   Sent to pharmacy as: simvastatin 20 mg tablet   Class: eRx   Route: Oral   E-Prescribing Status: Receipt confirmed by pharmacy (10/30/2017  2:08 PM CDT)   Associated Diagnoses     Pure hypercholesterolemia         Bibi Das RN  ....................  1/3/2019   11:44 AM      "

## 2019-01-10 DIAGNOSIS — F41.8 DEPRESSION WITH ANXIETY: Primary | ICD-10-CM

## 2019-01-11 RX ORDER — ESCITALOPRAM OXALATE 10 MG/1
TABLET ORAL
Qty: 90 TABLET | Refills: 3 | Status: SHIPPED | OUTPATIENT
Start: 2019-01-11 | End: 2019-01-01

## 2019-01-11 NOTE — TELEPHONE ENCOUNTER
Prescription approved per [facility] Refill Protocol.  Yasmine Mueller RN on 1/11/2019 at 3:14 PM

## 2019-03-25 NOTE — PROGRESS NOTES
Preprocedure diagnosis  History of bladder cancer    Postprocedure diagnosis  History of bladder cancer    Procedure  Flexible Cystourethroscopy    Surgeon  Florentin Cordoba MD    Anesthesia  2% lidocaine jelly intraurethrally    Complications  None    Indications  The patient is undergoing a flexible cystoscopy for the above mentioned indications.    Findings  Cystoscopic findings included a normal urethra.    The bladder appeared to be normal capacity.    There were no tumors, stones or foreign bodies.    The orifices were slit-shaped and in their normal location.    Procedure  The patient was placed in supine position and prepped and draped in sterile fashion with lidocaine jelly per urethra for anesthesia.    I passed a lubricated 14F flexible cystoscope through the urethra and into the bladder and the bladder was completely visualized.  The cystoscope was retroflexed and the bladder neck visualized.    The cystoscope was slowly withdrawn while visualizing the urethra and the procedure terminated.    The patient tolerated the procedure well.      Pathology  I personally reviewed the pathology report  11/4/2015  low grade Ta (muscularis propria was not present in specimen)    Plan  Follow up surveillance cystoscopy once annually

## 2019-03-25 NOTE — NURSING NOTE
Patient positioned in frog leg position prior to Florentin Cordoba MD prepping patient with chlorhexidine gluconate cleanser.    [place] Protocol    A. Pre-procedure verification complete Yes   1-relevant information / documentation available, reviewed and properly matched to the patient; 2-consent accurate and complete, 3-equipment and supplies available    B. Site marking complete N/A  Site marked if not in continuous attendance with patient    C. TIME OUT completed Yes  Time Out was conducted just prior to starting procedure to verify the eight required elements: 1-patient identity, 2-consent accurate and complete, 3-position, 4-correct side/site marked (if applicable), 5-procedure, 6-relevant images / results properly labeled and displayed (if applicable), 7-antibiotics / irrigation fluids (if applicable), 8-safety precautions.    After procedure perineum area rinsed. Discharge instructions reviewed with patient. Patient verbalized understanding of discharge instructions and discharged ambulatory.  Roxane Terrell..................3/25/2019  1:50 PM

## 2019-03-25 NOTE — PATIENT INSTRUCTIONS

## 2019-03-30 NOTE — LETTER
April 2, 2019      Annie Joy  [address] [facility] [address]        Dear Annie,     A refill of simvastatin (ZOCOR) 20 MG tablet has been requested by your pharmacy.  We noticed that it has been12 months since your last comprehensive visit and labs with Rosalina Clancy MD.  A limited 90 day supply has been sent to your pharmacy at this time.    Additional refills require a medication management appointment.  Your health is very important to us.  Please call the clinic at [phone] to schedule your appointment.    Thank you,    The Refill Nurse  [facility]

## 2019-04-02 NOTE — TELEPHONE ENCOUNTER
"Refill request from [facility] for:  simvastatin (ZOCOR) 20 MG tablet  Last filled for 90 day supply 1/3/2019    LOV 4/4/2018 with PCP   No changes noted to requested medication    Pt due for appt  No upcoming appt noted.      Will provide evelin refill, send letter, and add note to pharmacy.    Requested Prescriptions   Pending Prescriptions Disp Refills     simvastatin (ZOCOR) 20 MG tablet [Pharmacy Med Name: SIMVASTATIN 20MG  TAB] 90 tablet 0     Sig: TAKE 1 TABLET BY MOUTH AT BEDTIME    Statins Protocol Failed - 4/2/2019  2:54 PM       Failed - LDL on file in past 12 months    Recent Labs   Lab Test 03/18/16  1444   *            Passed - No abnormal creatine kinase in past 12 months    No lab results found.            Passed - Recent (12 mo) or future (30 days) visit within the authorizing provider's specialty    Patient had office visit in the last 12 months or has a visit in the next 30 days with authorizing provider or within the authorizing provider's specialty.  See \"Patient Info\" tab in inbasket, or \"Choose Columns\" in Meds & Orders section of the refill encounter.             Passed - Medication is active on med list       Passed - Patient is age 18 or older       Passed - No active pregnancy on record       Passed - No positive pregnancy test in past 12 months        Bibi Das RN  ....................  4/2/2019   3:01 PM      "

## 2019-04-09 NOTE — TELEPHONE ENCOUNTER
[facility] sent Rx request for the following:      LISINOPRIL 40MG     TAB   Sig: TAKE 1 TABLET BY MOUTH ONCE DAILY  Last Prescription Date:   4/4/18  Last Fill Qty/Refills:         90, R-3    Last Office Visit:              4/4/18  Future Office visit:           None.    ACE Inhibitors (Including Combos) Protocol Failed4/9 4:07 PM   Blood pressure under 140/90 in past 12 months    Recent (12 mo) or future (30 days) visit within the authorizing provider's specialty    Normal serum creatinine on file in past 12 months    Normal serum potassium on file in past 12 months     Patient due for annual exam and labs. Reminder letter sent to Pt on 4/2/19. Prescription approved per [facility] Refill Protocol for 90 day evelin refill. Yasmine Rosen RN .............. 4/9/2019  4:09 PM

## 2019-07-03 NOTE — TELEPHONE ENCOUNTER
"Refill request from [facility] for:  simvastatin (ZOCOR) 20 MG tablet    LOV 4/4/2018 with PCP     Medication filled for limited supply and letter sent 4/2/2019    No upcoming appt at this time.    Contacted patient and she states she would like to schedule an appointment but has a difficult time getting in to appts as she cares for her .  Will provide 30 day evelin refill.  Pt given clinic phone number and will call back to schedule med management appt.    Requested Prescriptions   Pending Prescriptions Disp Refills     simvastatin (ZOCOR) 20 MG tablet [Pharmacy Med Name: SIMVASTATIN 20MG  TAB] 90 tablet 0     Sig: TAKE 1 TABLET BY MOUTH AT BEDTIME       Statins Protocol Failed - 6/29/2019  3:05 PM        Failed - LDL on file in past 12 months     Recent Labs   Lab Test 03/18/16  1444   *             Failed - Recent (12 mo) or future (30 days) visit within the authorizing provider's specialty     Patient had office visit in the last 12 months or has a visit in the next 30 days with authorizing provider or within the authorizing provider's specialty.  See \"Patient Info\" tab in inbasket, or \"Choose Columns\" in Meds & Orders section of the refill encounter.              Passed - No abnormal creatine kinase in past 12 months     No lab results found.             Passed - Medication is active on med list        Passed - Patient is age 18 or older        Passed - No active pregnancy on record        Passed - No positive pregnancy test in past 12 months        Bibi Das RN  ....................  7/3/2019   4:40 PM      "

## 2019-07-10 NOTE — TELEPHONE ENCOUNTER
Refill request for: Lisinopril 40 mg tablets   From: [facility]   Rx written date: 04/09/2019 #90 with 0 refills  LOV: 04/04/2018 with PCP  Next appt: none noted  Protocol:  ACE Inhibitors (Including Combos) Protocol Failed   Blood pressure under 140/90 in past 12 months    Recent (12 mo) or future (30 days) visit within the authorizing provider's specialty    Normal serum creatinine on file in past 12 months    Normal serum potassium on file in past 12 months       Pt due for annual exam and labs. Reminder letter sent on 04/02/2019. Call to pt who is agreeable to scheduling appt for annual f/u. Soft transfer to scheduling line. Pt now scheduled for 07/18/2019 with PCP. Will provide limited refill to last until pt can be seen. Kirsty Olsen RN on 7/10/2019 at 2:19 PM

## 2019-07-18 NOTE — PROGRESS NOTES
SUBJECTIVE:   Annie Joy is a 78 year old female who presents for Preventive Visit.    Are you in the first 12 months of your Medicare coverage?  No    HPI  Do you feel safe in your environment? Yes    Do you have a Health Care Directive? No: Advance care planning was reviewed with patient; patient declined at this time.      Fall risk  Fallen 2 or more times in the past year?: No  Any fall with injury in the past year?: No    Cognitive Screening   1) Repeat 3 items (Leader, Season, Table)    2) Clock draw: NORMAL  3) 3 item recall: Recalls 2 objects   Results: NORMAL clock, 1-2 items recalled: COGNITIVE IMPAIRMENT LESS LIKELY    Mini-CogTM Copyright S Sang. Licensed by the author for use in [facility]; reprinted with permission (soob@[facility]). All rights reserved.      Do you have sleep apnea, excessive snoring or daytime drowsiness?: no    Reviewed and updated as needed this visit by clinical staff  Tobacco  Allergies  Soc Hx        Reviewed and updated as needed this visit by Provider        Social History     Tobacco Use     Smoking status: Current Every Day Smoker     Packs/day: 1.00     Years: 60.00     Pack years: 60.00     Types: Cigarettes     Smokeless tobacco: Never Used   Substance Use Topics     Alcohol use: No     Alcohol/week: 0.0 oz       Patient Care Team:  Rosalina Clancy MD as PCP - General (Family Practice)  Rosalina Clancy MD as Assigned PCP    The following health maintenance items are reviewed in Epic and correct as of today:  Health Maintenance   Topic Date Due     DEXA  1940     SPIROMETRY  1940     COPD ACTION PLAN  1940     DEPRESSION ACTION PLAN  1940     MEDICARE ANNUAL WELLNESS VISIT  09/29/2004     ZOSTER IMMUNIZATION (2 of 3) 02/11/2010     INFLUENZA VACCINE (1) 09/01/2019     PHQ-9  01/18/2020     FALL RISK ASSESSMENT  07/18/2020     LIPID  03/18/2021     ADVANCE CARE PLANNING  04/04/2023     DTAP/TDAP/TD IMMUNIZATION (4 - Td)  "07/30/2025     IPV IMMUNIZATION  Aged Out     MENINGITIS IMMUNIZATION  Aged Out       Review of Systems  Constitutional, HEENT, cardiovascular, pulmonary, gi and gu systems are negative, except as otherwise noted.    OBJECTIVE:   /80   Pulse 56   Temp 98.1  F (36.7  C)   Resp 12   Ht 1.702 m (5' 7\")   Wt 62.3 kg (137 lb 6.4 oz)   Breastfeeding? No   BMI 21.52 kg/m   Estimated body mass index is 21.52 kg/m  as calculated from the following:    Height as of this encounter: 1.702 m (5' 7\").    Weight as of this encounter: 62.3 kg (137 lb 6.4 oz).  Physical Exam   Constitutional: She appears well-developed and well-nourished. No distress.   Smells strongly of tobacco.   Cardiovascular: Normal rate and regular rhythm.   Pulmonary/Chest: Effort normal.   Long expiratory phase.   Neurological: She is alert.   Psychiatric: She has a normal mood and affect. Her behavior is normal.   Nursing note and vitals reviewed.      Diagnostic Test Results: Ending see results.    ASSESSMENT / PLAN:       ICD-10-CM    1. Medication management Z79.899    2. Depression with anxiety F41.8 escitalopram (LEXAPRO) 10 MG tablet   3. Essential hypertension I10 hydrochlorothiazide (MICROZIDE) 12.5 MG capsule     lisinopril (PRINIVIL/ZESTRIL) 40 MG tablet     Comprehensive Metabolic Panel     CBC and Differential     CBC and Differential     Comprehensive Metabolic Panel   4. Pure hypercholesterolemia E78.00 simvastatin (ZOCOR) 20 MG tablet       End of Life Planning:  Patient currently has an advanced directive: No.  I have verified the patient's ablity to prepare an advanced directive/make health care decisions.  Literature was provided to assist patient in preparing an advanced directive.    COUNSELING:  Reviewed preventive health counseling, as reflected in patient instructions       Smoking cessation declined    Estimated body mass index is 21.52 kg/m  as calculated from the following:    Height as of this encounter: 1.702 m (5' " "7\").    Weight as of this encounter: 62.3 kg (137 lb 6.4 oz).    Plan:  Continue current medications and refills provided.  Smoking cessation is always addressed with this patient and she has no intentions of quitting.  She continues at 1 pack/day as does her spouse.  Immunizations up-to-date and declines any other health maintenance issues such as mammography.  Follow-up annually and at her discretion.  Rosalina Clancy MD  3:01 PM 7/18/2019   Portions of this dictation were created using the Dragon Nuance voice recognition system. Proofreading was completed but there may be errors in text.              "

## 2019-07-18 NOTE — NURSING NOTE
Patient presents to clinic for medicare wellness visit.  Marybeth Kline ....................  7/18/2019   2:10 PM

## 2019-07-18 NOTE — LETTER
July 23, 2019      Annie Joy  [address] [address]        Dear ,       Here are the labs done at your recent clinic visit.  None of these are of concern.  Continue on your medications as you have been.    Resulted Orders   CBC and Differential   Result Value Ref Range    WBC 6.8 4.0 - 11.0 10e9/L    RBC Count 5.74 (H) 3.8 - 5.2 10e12/L    Hemoglobin 15.1 11.7 - 15.7 g/dL    Hematocrit 46.0 35.0 - 47.0 %    MCV 80 78 - 100 fl    MCH 26.3 (L) 26.5 - 33.0 pg    MCHC 32.8 31.5 - 36.5 g/dL    RDW 13.8 10.0 - 15.0 %    Platelet Count 184 150 - 450 10e9/L    Diff Method Automated Method     % Neutrophils 71.6 %    % Lymphocytes 19.2 %    % Monocytes 7.9 %    % Eosinophils 0.6 %    % Basophils 0.4 %    % Immature Granulocytes 0.3 %    Absolute Neutrophil 4.9 1.6 - 8.3 10e9/L    Absolute Lymphocytes 1.3 0.8 - 5.3 10e9/L    Absolute Monocytes 0.5 0.0 - 1.3 10e9/L    Absolute Eosinophils 0.0 0.0 - 0.7 10e9/L    Absolute Basophils 0.0 0.0 - 0.2 10e9/L    Abs Immature Granulocytes 0.0 0 - 0.4 10e9/L   Comprehensive Metabolic Panel   Result Value Ref Range    Sodium 131 (L) 134 - 144 mmol/L    Potassium 3.5 3.5 - 5.1 mmol/L    Chloride 94 (L) 98 - 107 mmol/L    Carbon Dioxide 29 21 - 31 mmol/L    Anion Gap 8 3 - 14 mmol/L    Glucose 116 (H) 70 - 105 mg/dL    Urea Nitrogen 7 7 - 25 mg/dL    Creatinine 0.78 0.60 - 1.20 mg/dL    GFR Estimate 71 >60 mL/min/[1.73_m2]    GFR Estimate If Black 86 >60 mL/min/[1.73_m2]    Calcium 9.6 8.6 - 10.3 mg/dL    Bilirubin Total 0.9 0.3 - 1.0 mg/dL    Albumin 4.7 3.5 - 5.7 g/dL    Protein Total 7.0 6.4 - 8.9 g/dL    Alkaline Phosphatase 57 34 - 104 U/L    ALT 8 7 - 52 U/L    AST 19 13 - 39 U/L       If you have any questions or concerns, please call the clinic at the number listed above.       Sincerely,        Rosalina Clancy MD

## 2019-12-19 NOTE — TELEPHONE ENCOUNTER
Metoprolol Tartrate 50 MG Oral Tablet   Last Written Prescription Date:  12/24/2018  Last Fill Quantity: 360,   # refills: 4  Last Office Visit: 7/18/2019  Future Office visit:       Routing refill request to provider for review/approval because:  Blood pressure out of range  142/80 and has been seen 7/18/2019 with this medication not renewed. Will forward to  provider due to Dr. Clancy not in clinic anymore. Unable to complete prescription refill per RNMedication Refill Policy.................... Yessenia Waite RN ....................  12/19/2019   10:59 AM

## 2020-01-01 ENCOUNTER — HOSPITAL ENCOUNTER (INPATIENT)
Facility: OTHER | Age: 80
LOS: 2 days | DRG: 065 | End: 2020-01-31
Attending: INTERNAL MEDICINE | Admitting: INTERNAL MEDICINE
Payer: MEDICARE

## 2020-01-01 ENCOUNTER — APPOINTMENT (OUTPATIENT)
Dept: GENERAL RADIOLOGY | Facility: OTHER | Age: 80
DRG: 065 | End: 2020-01-01
Attending: EMERGENCY MEDICINE
Payer: MEDICARE

## 2020-01-01 ENCOUNTER — APPOINTMENT (OUTPATIENT)
Dept: CT IMAGING | Facility: OTHER | Age: 80
DRG: 065 | End: 2020-01-01
Attending: EMERGENCY MEDICINE
Payer: MEDICARE

## 2020-01-01 VITALS
SYSTOLIC BLOOD PRESSURE: 153 MMHG | HEART RATE: 56 BPM | RESPIRATION RATE: 32 BRPM | TEMPERATURE: 101.5 F | DIASTOLIC BLOOD PRESSURE: 84 MMHG | OXYGEN SATURATION: 96 %

## 2020-01-01 DIAGNOSIS — I62.9 INTRACRANIAL HEMORRHAGE (H): ICD-10-CM

## 2020-01-01 DIAGNOSIS — I63.9 STROKE (H): ICD-10-CM

## 2020-01-01 DIAGNOSIS — F17.210 NICOTINE DEPENDENCE, CIGARETTES, UNCOMPLICATED: ICD-10-CM

## 2020-01-01 DIAGNOSIS — Z79.899 ENCOUNTER FOR LONG-TERM (CURRENT) USE OF OTHER MEDICATIONS: ICD-10-CM

## 2020-01-01 DIAGNOSIS — Z79.82 ENCOUNTER FOR LONG-TERM (CURRENT) USE OF ASPIRIN: ICD-10-CM

## 2020-01-01 DIAGNOSIS — I21.9 ACUTE MYOCARDIAL INFARCTION, UNSPECIFIED MI TYPE, UNSPECIFIED ARTERY (H): ICD-10-CM

## 2020-01-01 DIAGNOSIS — I10 ESSENTIAL (PRIMARY) HYPERTENSION: ICD-10-CM

## 2020-01-01 DIAGNOSIS — I21.9 ACUTE MYOCARDIAL INFARCTION, INITIAL EPISODE OF CARE (H): ICD-10-CM

## 2020-01-01 LAB
ALBUMIN SERPL-MCNC: 4.6 G/DL (ref 3.5–5.7)
ALP SERPL-CCNC: 59 U/L (ref 34–104)
ALT SERPL W P-5'-P-CCNC: 39 U/L (ref 7–52)
ANION GAP SERPL CALCULATED.3IONS-SCNC: 14 MMOL/L (ref 3–14)
APTT PPP: 27 SEC (ref 22–37)
AST SERPL W P-5'-P-CCNC: 41 U/L (ref 13–39)
BASOPHILS # BLD AUTO: 0 10E9/L (ref 0–0.2)
BASOPHILS NFR BLD AUTO: 0.1 %
BILIRUB SERPL-MCNC: 0.9 MG/DL (ref 0.3–1)
BUN SERPL-MCNC: 10 MG/DL (ref 7–25)
CALCIUM SERPL-MCNC: 9 MG/DL (ref 8.6–10.3)
CHLORIDE SERPL-SCNC: 91 MMOL/L (ref 98–107)
CO2 SERPL-SCNC: 22 MMOL/L (ref 21–31)
CREAT SERPL-MCNC: 0.61 MG/DL (ref 0.6–1.2)
DIFFERENTIAL METHOD BLD: ABNORMAL
EOSINOPHIL # BLD AUTO: 0 10E9/L (ref 0–0.7)
EOSINOPHIL NFR BLD AUTO: 0 %
ERYTHROCYTE [DISTWIDTH] IN BLOOD BY AUTOMATED COUNT: 13.6 % (ref 10–15)
GFR SERPL CREATININE-BSD FRML MDRD: >90 ML/MIN/{1.73_M2}
GLUCOSE SERPL-MCNC: 223 MG/DL (ref 70–105)
HCT VFR BLD AUTO: 44.8 % (ref 35–47)
HGB BLD-MCNC: 14.9 G/DL (ref 11.7–15.7)
IMM GRANULOCYTES # BLD: 0.1 10E9/L (ref 0–0.4)
IMM GRANULOCYTES NFR BLD: 0.4 %
INR PPP: 1.16 (ref 0–1.3)
INTERPRETATION ECG - MUSE: NORMAL
LACTATE SERPL-SCNC: 3 MMOL/L (ref 0.5–2.2)
LYMPHOCYTES # BLD AUTO: 0.6 10E9/L (ref 0.8–5.3)
LYMPHOCYTES NFR BLD AUTO: 3.4 %
MCH RBC QN AUTO: 26.1 PG (ref 26.5–33)
MCHC RBC AUTO-ENTMCNC: 33.3 G/DL (ref 31.5–36.5)
MCV RBC AUTO: 79 FL (ref 78–100)
MONOCYTES # BLD AUTO: 1 10E9/L (ref 0–1.3)
MONOCYTES NFR BLD AUTO: 5.7 %
NEUTROPHILS # BLD AUTO: 15.4 10E9/L (ref 1.6–8.3)
NEUTROPHILS NFR BLD AUTO: 90.4 %
PLATELET # BLD AUTO: 172 10E9/L (ref 150–450)
POTASSIUM SERPL-SCNC: 2.7 MMOL/L (ref 3.5–5.1)
PROT SERPL-MCNC: 7.2 G/DL (ref 6.4–8.9)
RADIOLOGIST FLAGS: ABNORMAL
RBC # BLD AUTO: 5.7 10E12/L (ref 3.8–5.2)
SODIUM SERPL-SCNC: 127 MMOL/L (ref 134–144)
TROPONIN I SERPL-MCNC: 1057.7 PG/ML
WBC # BLD AUTO: 17 10E9/L (ref 4–11)

## 2020-01-01 PROCEDURE — 36415 COLL VENOUS BLD VENIPUNCTURE: CPT | Performed by: EMERGENCY MEDICINE

## 2020-01-01 PROCEDURE — 80053 COMPREHEN METABOLIC PANEL: CPT | Performed by: EMERGENCY MEDICINE

## 2020-01-01 PROCEDURE — 12000000 ZZH R&B MED SURG/OB

## 2020-01-01 PROCEDURE — 85025 COMPLETE CBC W/AUTO DIFF WBC: CPT | Performed by: EMERGENCY MEDICINE

## 2020-01-01 PROCEDURE — 25000128 H RX IP 250 OP 636: Performed by: INTERNAL MEDICINE

## 2020-01-01 PROCEDURE — 40000275 ZZH STATISTIC RCP TIME EA 10 MIN

## 2020-01-01 PROCEDURE — 93010 ELECTROCARDIOGRAM REPORT: CPT | Performed by: INTERNAL MEDICINE

## 2020-01-01 PROCEDURE — 99291 CRITICAL CARE FIRST HOUR: CPT | Mod: Z6 | Performed by: EMERGENCY MEDICINE

## 2020-01-01 PROCEDURE — 99291 CRITICAL CARE FIRST HOUR: CPT | Mod: 25 | Performed by: EMERGENCY MEDICINE

## 2020-01-01 PROCEDURE — 25000125 ZZHC RX 250: Performed by: INTERNAL MEDICINE

## 2020-01-01 PROCEDURE — 71045 X-RAY EXAM CHEST 1 VIEW: CPT

## 2020-01-01 PROCEDURE — 93005 ELECTROCARDIOGRAM TRACING: CPT | Performed by: EMERGENCY MEDICINE

## 2020-01-01 PROCEDURE — 70450 CT HEAD/BRAIN W/O DYE: CPT

## 2020-01-01 PROCEDURE — 36600 WITHDRAWAL OF ARTERIAL BLOOD: CPT | Performed by: EMERGENCY MEDICINE

## 2020-01-01 PROCEDURE — 84484 ASSAY OF TROPONIN QUANT: CPT | Performed by: EMERGENCY MEDICINE

## 2020-01-01 PROCEDURE — 83605 ASSAY OF LACTIC ACID: CPT | Performed by: EMERGENCY MEDICINE

## 2020-01-01 PROCEDURE — 99231 SBSQ HOSP IP/OBS SF/LOW 25: CPT | Performed by: INTERNAL MEDICINE

## 2020-01-01 PROCEDURE — 85610 PROTHROMBIN TIME: CPT | Performed by: EMERGENCY MEDICINE

## 2020-01-01 PROCEDURE — 85730 THROMBOPLASTIN TIME PARTIAL: CPT | Performed by: EMERGENCY MEDICINE

## 2020-01-01 PROCEDURE — 25000132 ZZH RX MED GY IP 250 OP 250 PS 637: Performed by: INTERNAL MEDICINE

## 2020-01-01 PROCEDURE — 99222 1ST HOSP IP/OBS MODERATE 55: CPT | Mod: AI | Performed by: INTERNAL MEDICINE

## 2020-01-01 RX ORDER — LORAZEPAM 2 MG/ML
.5-1 INJECTION INTRAMUSCULAR
Status: DISCONTINUED | OUTPATIENT
Start: 2020-01-01 | End: 2020-01-31 | Stop reason: HOSPADM

## 2020-01-01 RX ORDER — MORPHINE SULFATE 100 MG/5ML
5-10 SOLUTION ORAL
Status: DISCONTINUED | OUTPATIENT
Start: 2020-01-01 | End: 2020-01-31 | Stop reason: HOSPADM

## 2020-01-01 RX ORDER — ATROPINE SULFATE 10 MG/ML
1-2 SOLUTION/ DROPS OPHTHALMIC
Status: DISCONTINUED | OUTPATIENT
Start: 2020-01-01 | End: 2020-01-31 | Stop reason: HOSPADM

## 2020-01-01 RX ORDER — MORPHINE SULFATE 2 MG/ML
1-2 INJECTION, SOLUTION INTRAMUSCULAR; INTRAVENOUS
Status: DISCONTINUED | OUTPATIENT
Start: 2020-01-01 | End: 2020-01-31 | Stop reason: HOSPADM

## 2020-01-01 RX ORDER — LORAZEPAM 0.5 MG/1
.5-1 TABLET ORAL
Status: DISCONTINUED | OUTPATIENT
Start: 2020-01-01 | End: 2020-01-31 | Stop reason: HOSPADM

## 2020-01-01 RX ORDER — ONDANSETRON 4 MG/1
4 TABLET, ORALLY DISINTEGRATING ORAL EVERY 6 HOURS PRN
Status: DISCONTINUED | OUTPATIENT
Start: 2020-01-01 | End: 2020-01-31 | Stop reason: HOSPADM

## 2020-01-01 RX ORDER — ONDANSETRON 2 MG/ML
4 INJECTION INTRAMUSCULAR; INTRAVENOUS EVERY 6 HOURS PRN
Status: DISCONTINUED | OUTPATIENT
Start: 2020-01-01 | End: 2020-01-31 | Stop reason: HOSPADM

## 2020-01-01 RX ADMIN — MORPHINE SULFATE 10 MG: 20 SOLUTION ORAL at 12:29

## 2020-01-01 RX ADMIN — ATROPINE SULFATE 2 DROP: 10 SOLUTION OPHTHALMIC at 05:29

## 2020-01-01 RX ADMIN — ATROPINE SULFATE 2 DROP: 10 SOLUTION OPHTHALMIC at 14:31

## 2020-01-01 RX ADMIN — MORPHINE SULFATE 5 MG: 20 SOLUTION ORAL at 09:42

## 2020-01-01 RX ADMIN — MORPHINE SULFATE 10 MG: 20 SOLUTION ORAL at 14:26

## 2020-01-01 RX ADMIN — MORPHINE SULFATE 10 MG: 20 SOLUTION ORAL at 16:36

## 2020-01-01 RX ADMIN — ATROPINE SULFATE 2 DROP: 10 SOLUTION OPHTHALMIC at 03:45

## 2020-01-01 RX ADMIN — MORPHINE SULFATE 1 MG: 2 INJECTION, SOLUTION INTRAMUSCULAR; INTRAVENOUS at 11:05

## 2020-01-01 RX ADMIN — ATROPINE SULFATE 2 DROP: 10 SOLUTION OPHTHALMIC at 08:54

## 2020-01-01 ASSESSMENT — ACTIVITIES OF DAILY LIVING (ADL)
RETIRED_EATING: 0-->INDEPENDENT
FALL_HISTORY_WITHIN_LAST_SIX_MONTHS: NO
COGNITION: 1 - ATTENTION OR MEMORY DEFICITS
TOILETING: 0-->INDEPENDENT
TRANSFERRING: 0-->INDEPENDENT
ADLS_ACUITY_SCORE: 13
SWALLOWING: 0-->SWALLOWS FOODS/LIQUIDS WITHOUT DIFFICULTY
ADLS_ACUITY_SCORE: 13
ADLS_ACUITY_SCORE: 13
DRESS: 0-->INDEPENDENT
ADLS_ACUITY_SCORE: 13
AMBULATION: 0-->INDEPENDENT
ADLS_ACUITY_SCORE: 13
ADLS_ACUITY_SCORE: 14
ADLS_ACUITY_SCORE: 13
WHICH_OF_THE_ABOVE_FUNCTIONAL_RISKS_HAD_A_RECENT_ONSET_OR_CHANGE?: AMBULATION;TRANSFERRING;TOILETING;BATHING;DRESSING;EATING;SWALLOWING;COGNITION;COMMUNICATION/SPEECH
RETIRED_COMMUNICATION: 0-->UNDERSTANDS/COMMUNICATES WITHOUT DIFFICULTY
ADLS_ACUITY_SCORE: 13
BATHING: 0-->INDEPENDENT

## 2020-01-29 PROBLEM — I61.9 ICH (INTRACEREBRAL HEMORRHAGE) (H): Status: ACTIVE | Noted: 2020-01-01

## 2020-01-29 NOTE — ED NOTES
Patient's son would like comfort cares only.  MD spoke with Dr. Ruiz who will admit for comfort cares.  Stroke code de-escalated.

## 2020-01-29 NOTE — H&P
[facility]    History and Physical  Hospitalist       Date of Admission:  1/29/2020    Assessment & Plan   Annie Joy is a 79 year old female who presents with unresponsiveness.    Principal Problem:    Obstructive hydrocephalus    Assessment: Acute, severe.  Patient is terminal and expect her to pass away within 24 to 48 hours.  I spoke with her son regarding this and he understands and agrees with comfort care.    Plan: Admit to inpatient comfort care, morphine and Ativan as needed.    Hemorrhagic stroke with coma    Assessment: Acute, severe.  GCS 3. Patient is terminal and expect her to pass away within 24 to 48 hours.  I spoke with her son regarding this and he understands and agrees with comfort care.    Plan: Admit to inpatient comfort care, morphine and Ativan as needed.    ICH (intracerebral hemorrhage) (H)    Assessment: Acute, severe.  Patient is terminal and expect her to pass away within 24 to 48 hours.  I spoke with her son regarding this and he understands and agrees with comfort care.    Plan: Admit to inpatient comfort care, morphine and Ativan as needed.    Active Problems:    Pulmonary emphysema (H)      Hypertension    DVT Prophylaxis: comfort care  Code Status: DNR/DNI    Uvaldo Ruiz    Primary Care Physician   Physician No Ref-Primary    Chief Complaint   Unresponsive    History is obtained from chart review.    History of Present Illness   Annie Joy is a 79 year old female who presents with unresponsiveness.  She was found on the floor by her son at 1215.  Last known well yesterday afternoon.  The patient had normal vital signs but was otherwise unresponsive.  Stroke code was called and a CT of the head shows a large intracerebral hemorrhage.  Dr. Presley contacted neurosurgery at [facility] who felt she was terminal and that no interventions could be performed.    Past Medical History    I have reviewed this patient's medical history and updated it with  pertinent information if needed.   Past Medical History:   Diagnosis Date     Age-related osteoporosis without current pathological fracture     No Comments Provided     Personal history of other medical treatment (CODE)     , term NVD     Personal history of other medical treatment (CODE)     in previous relationship.       Past Surgical History   I have reviewed this patient's surgical history and updated it with pertinent information if needed.  Past Surgical History:   Procedure Laterality Date     LAMINECTOMY LUMBAR ONE LEVEL           MAMMOPLASTY AUGMENTATION      ,Silicone       Prior to Admission Medications   Prior to Admission Medications   Prescriptions Last Dose Informant Patient Reported? Taking?   CALCIUM PO   Yes No   Sig: Take 600 mg by mouth daily   aspirin EC 81 MG EC tablet   Yes No   Sig: Take 81 mg by mouth daily with food   escitalopram (LEXAPRO) 10 MG tablet   No No   Sig: Take 1 tablet (10 mg) by mouth daily   hydrochlorothiazide (MICROZIDE) 12.5 MG capsule   No No   Sig: TAKE ONE CAPSULE BY MOUTH ONCE DAILY   lisinopril (PRINIVIL/ZESTRIL) 40 MG tablet   No No   Sig: Take 1 tablet (40 mg) by mouth daily   metoprolol tartrate (LOPRESSOR) 100 MG tablet   No No   Sig: Take 1 tablet (100 mg) by mouth 2 times daily   metoprolol tartrate (LOPRESSOR) 50 MG tablet   No No   Sig: TAKE TWO TABLETS BY MOUTH IN THE MORNING AND TWO IN THE EVENING   simvastatin (ZOCOR) 20 MG tablet   No No   Sig: Take 1 tablet (20 mg) by mouth At Bedtime      Facility-Administered Medications: None     Allergies   Allergies   Allergen Reactions     Crabs  [Crustaceans] Anaphylaxis and Nausea and Vomiting     Crab meat     Pollen Extract Dermatitis     Propoxyphene Dermatitis       Social History   I have reviewed this patient's social history and updated it with pertinent information if needed. Annie Joy  reports that she has been smoking cigarettes. She has a 60.00 pack-year smoking history. She has  never used smokeless tobacco. She reports that she does not drink alcohol.    Family History   I have reviewed this patient's family history and updated it with pertinent information if needed.   Family History   Problem Relation Age of Onset     Breast Cancer Mother         Cancer-breast     Heart Disease Mother 70        Heart Disease,Aortic aneurysm     Hypertension Mother 70        Hypertension     Other - See Comments Mother 50        Psychiatric illness,Mental illness/Alzheimers     Thyroid Disease Mother 70        Thyroid Disease     Hypertension Father 70        Hypertension     Alcoholism Father 20        Alcoholism     Other - See Comments Father 70        Emphysema     Alcoholism Sister         Alcoholism       Review of Systems     REVIEW OF SYSTEMS:    Not obtainable.  The patient son tells me that yesterday she went out for lunch with a friend.    Physical Exam       BP: (!) 153/84 Pulse: 56 Heart Rate: 56 Resp: 19 SpO2: 96 % O2 Device: None (Room air)    Vital Signs with Ranges  Pulse:  [56-60] 56  Heart Rate:  [56-59] 56  Resp:  [19] 19  BP: (153-181)/(83-91) 153/84  SpO2:  [96 %] 96 %  0 lbs 0 oz    GENERAL: unresponsive  HEENT: Anicteric, non-injected sclera, mouth moist.   NECK: No JVD.  CARDIOVASCULAR: regular rate and rhythm, no murmur. No lower extremity edema   RESPIRATORY: Clear to auscultation bilaterally, no wheezes, no crackles.  GI: Non-distended, normal bowel sounds, soft, non-tender.  SKIN: No rashes, sores.   NEUROLOGY: right facial droop, unresponsive    Data   Data reviewed today:  I personally reviewed the head CT image(s) showing ICH.  Recent Labs   Lab 01/29/20  1258   WBC 17.0*   HGB 14.9   MCV 79      INR 1.16   *   POTASSIUM 2.7*   CHLORIDE 91*   CO2 22   BUN 10   CR 0.61   ANIONGAP 14   CAROLE 9.0   *   ALBUMIN 4.6   PROTTOTAL 7.2   BILITOTAL 0.9   ALKPHOS 59   ALT 39   AST 41*       Recent Results (from the past 24 hour(s))   CT Head w/o Contrast   Result  Value    Radiologist flags Acute intracranial hemorrhage (AA)    Narrative    PROCEDURE: CT HEAD W/O CONTRAST     HISTORY: Altered level of consciousness (LOC), unexplained.    COMPARISON: None.    TECHNIQUE:  Helical images of the head from the foramen magnum to the  vertex were obtained without contrast.    FINDINGS: There is acute left inferior frontal parenchymal hemorrhage  spanning 6.2 x 3.4 x 1.8 cm.     There is acute intraventricular hemorrhage with casting of all 4  ventricles. Heterogeneous attenuation may reflect ongoing hemorrhage  or failure of coagulation. There is acute obstructive hydrocephalus.  There is evidence of downward herniation with crowding at the foramen  magnum.    There is extensive subarachnoid hemorrhage with casting of the  suprasellar, quadrigeminal and prepontine cisterns as well as the  sylvian fissures.    Edema and possible transcortical ischemia is seen in the inferior  frontal lobes area there is diffuse white matter hypoattenuation.    The calvarium is intact.      Impression    IMPRESSION: Extensive acute intracranial hemorrhage.     Large intraparenchymal hemorrhage within the left inferior frontal  lobe.     Extensive intraventricular hemorrhage, with heterogeneous attenuation  potentially reflecting ongoing bleeding or failure of coagulation.  Acute obstructive hydrocephalus. Downward herniation with effacement  of the CSF the level of the foramen magnum.      Large volume diffuse subarachnoid hemorrhage.    [Critical Result: Acute intracranial hemorrhage]    Finding was identified on 1/29/2020 12:59 PM.     Dr. Rangel was contacted by me on 1/29/2020 1:00 PM and verbalized  understanding of the critical result.     CAT RANDHAWA MD   XR Chest Port 1 View    Narrative    XR CHEST PORT 1 VW    HISTORY: 79 yearsFemale aspiration    TECHNIQUE: A single view of the chest was performed    COMPARISON: None    FINDINGS: Heart size and pulmonary vascularity are within  normal  limits. Lungs are clear. No consolidating airspace opacities are  present.        Impression    IMPRESSION: Clear chest    RINA CHARLES MD

## 2020-01-29 NOTE — ED NOTES
Patient presents via EMS and is unresponsive on passing in [place].  Patient brought immediately to CT.  18g in place per Meds1, however may not work well.  Will attempt 2nd IV after CT of head without contrast. Patient has nasal trumpet in right nare, snoring

## 2020-01-29 NOTE — PLAN OF CARE
No observable signs or symptoms of pain or discomfort at rest or with repositioning, has moncada and is patent and draining urine, respirations are labored at times with use of abdominal muscles, snoring, with brief periods of apnea. Upper airway has audible increase amount of secretions, was given Atropine, and this was effective. Continue to turn, position Q2h, and oral cares at that interval also.     Karlie Leyva RN on 1/29/2020 at 5:07 PM

## 2020-01-29 NOTE — PROGRESS NOTES
Admission Note    Data:  Annie Joy admitted to [facility] [id number] from emergency room at 1400.      Action:  Dr. Ruiz has been notified of admission. Call light in reach, patient's sons have been oriented to room and plan of care    Response:  Patient's 2 sons verbalize understanding of orientation and education to room, routine, plan of care, and reason for admission.     Karlie Leyva RN on 1/29/2020 at 2:17 PM

## 2020-01-29 NOTE — PHARMACY-ADMISSION MEDICATION HISTORY
Pharmacy -- Admission Medication Reconciliation    Prior to admission (PTA) medications were reviewed and the patient's PTA medication list was updated.    Sources Consulted: sure scripts, chart note    The reliability of this Medication Reconciliation is: Reliability: Borderline reliable    The following significant changes were made:  Removed duplicate metoprolol  Removed [facility] notes     Medication barriers identified: not assessed, patient unresponsive   Medication adherence concerns: not assessed, patient unresponsive   Understanding of emergency medications: n/a    Chantel Macedo [facility], 1/29/2020,  2:42 PM

## 2020-01-29 NOTE — ED PROVIDER NOTES
History   No chief complaint on file.    KAYLEIGH Joy is a 79 year old female who   Was found unresponsive by her son at 1215 this afternoon.   Patient apparently had vomited at least once or twice at home.  Patient's last known well known down time was yesterday afternoon by telephone conversation.  Patient was brought in the emergency room by ambulance for further evaluation.  Vitals are found to be stable DESPITE patient being unresponsive to pain.  No history can be obtained from the patient due to patient's unresponsive. on consultation with josephine son, he wished her mother to be DNR/DNI unless patient could return to normal life.  Allergies:  Allergies   Allergen Reactions     Crabs  [Crustaceans] Anaphylaxis and Nausea and Vomiting     Crab meat     Pollen Extract Dermatitis     Propoxyphene Dermatitis       Problem List:    Patient Active Problem List    Diagnosis Date Noted     ICH (intracerebral hemorrhage) (H) 01/29/2020     Priority: Medium     Generalized anxiety disorder 01/23/2018     Priority: Medium     H/O breast implant 10/30/2017     Priority: Medium     History of bladder cancer 11/18/2015     Priority: Medium     Full dentures 11/06/2013     Priority: Medium     Chest pain, atypical 02/24/2012     Priority: Medium     Pulmonary emphysema (H) 09/25/2007     Priority: Medium     Hypertension 09/25/2007     Priority: Medium     Obstructive chronic bronchitis without exacerbation (H) 09/25/2007     Priority: Medium     Osteoporosis 07/31/2006     Priority: Medium     Overview:   DEXA 2011       Pure hypercholesterolemia 05/22/2000     Priority: Medium     Tobacco user 05/22/2000     Priority: Medium        Past Medical History:    Past Medical History:   Diagnosis Date     Age-related osteoporosis without current pathological fracture      Personal history of other medical treatment (CODE)      Personal history of other medical treatment (CODE)        Past Surgical History:    Reviewed  Past Surgical History:   Procedure Laterality Date     LAMINECTOMY LUMBAR ONE LEVEL           MAMMOPLASTY AUGMENTATION      ,Silicone       Family History: Reviewed  Family History   Problem Relation Age of Onset     Breast Cancer Mother         Cancer-breast     Heart Disease Mother 70        Heart Disease,Aortic aneurysm     Hypertension Mother 70        Hypertension     Other - See Comments Mother 50        Psychiatric illness,Mental illness/Alzheimers     Thyroid Disease Mother 70        Thyroid Disease     Hypertension Father 70        Hypertension     Alcoholism Father 20        Alcoholism     Other - See Comments Father 70        Emphysema     Alcoholism Sister         Alcoholism       Social History: Has smoked 1/2 pack/day for decades.  Patient's  apparently just  last year.  Marital Status:   [2]  Social History     Tobacco Use     Smoking status: Current Every Day Smoker     Packs/day: 1.00     Years: 60.00     Pack years: 60.00     Types: Cigarettes     Smokeless tobacco: Never Used   Substance Use Topics     Alcohol use: No     Alcohol/week: 0.0 standard drinks     Drug use: Unknown     Types: Other     Comment: Drug use: No        Medications:    aspirin EC 81 MG EC tablet  CALCIUM PO  escitalopram (LEXAPRO) 10 MG tablet  hydrochlorothiazide (MICROZIDE) 12.5 MG capsule  lisinopril (PRINIVIL/ZESTRIL) 40 MG tablet  metoprolol tartrate (LOPRESSOR) 100 MG tablet  metoprolol tartrate (LOPRESSOR) 50 MG tablet  simvastatin (ZOCOR) 20 MG tablet          Review of Systems   All other systems reviewed and are negative.  Review of systems unable to be obtained due to patient's unresponsiveness.    Physical Exam          Physical Exam  Vitals signs and nursing note reviewed.   Constitutional:       Comments: Obtunded and unresponsive to pain.  Patient is breathing normally.   HENT:      Head: Normocephalic and atraumatic.      Mouth/Throat:      Mouth: Mucous membranes are  moist.      Pharynx: Oropharynx is clear.   Neck:      Musculoskeletal: Normal range of motion.   Cardiovascular:      Rate and Rhythm: Normal rate.      Pulses: Normal pulses.   Pulmonary:      Effort: Pulmonary effort is normal.   Abdominal:      General: Abdomen is flat.      Palpations: Abdomen is soft.   Musculoskeletal: Normal range of motion.   Skin:     General: Skin is warm.   Neurological:      Comments: Unresponsive to pain.         ED Course        Procedures patient was seen emerging by myself with emergent head CT done.  Stroke protocol was obtained.  Patient was found to have a very large subdural hematoma with with secondary obstructive hydrocephalus noted.                 Results for orders placed or performed during the hospital encounter of 01/29/20 (from the past 24 hour(s))   CT Head w/o Contrast   Result Value Ref Range    Radiologist flags Acute intracranial hemorrhage (AA)     Narrative    PROCEDURE: CT HEAD W/O CONTRAST     HISTORY: Altered level of consciousness (LOC), unexplained.    COMPARISON: None.    TECHNIQUE:  Helical images of the head from the foramen magnum to the  vertex were obtained without contrast.    FINDINGS: There is acute left inferior frontal parenchymal hemorrhage  spanning 6.2 x 3.4 x 1.8 cm.     There is acute intraventricular hemorrhage with casting of all 4  ventricles. Heterogeneous attenuation may reflect ongoing hemorrhage  or failure of coagulation. There is acute obstructive hydrocephalus.  There is evidence of downward herniation with crowding at the foramen  magnum.    There is extensive subarachnoid hemorrhage with casting of the  suprasellar, quadrigeminal and prepontine cisterns as well as the  sylvian fissures.    Edema and possible transcortical ischemia is seen in the inferior  frontal lobes area there is diffuse white matter hypoattenuation.    The calvarium is intact.      Impression    IMPRESSION: Extensive acute intracranial hemorrhage.     Large  intraparenchymal hemorrhage within the left inferior frontal  lobe.     Extensive intraventricular hemorrhage, with heterogeneous attenuation  potentially reflecting ongoing bleeding or failure of coagulation.  Acute obstructive hydrocephalus. Downward herniation with effacement  of the CSF the level of the foramen magnum.      Large volume diffuse subarachnoid hemorrhage.    [Critical Result: Acute intracranial hemorrhage]    Finding was identified on 1/29/2020 12:59 PM.     Dr. Rangel was contacted by me on 1/29/2020 1:00 PM and verbalized  understanding of the critical result.     CAT RANDHAWA MD   CBC with platelets differential   Result Value Ref Range    WBC 17.0 (H) 4.0 - 11.0 10e9/L    RBC Count 5.70 (H) 3.8 - 5.2 10e12/L    Hemoglobin 14.9 11.7 - 15.7 g/dL    Hematocrit 44.8 35.0 - 47.0 %    MCV 79 78 - 100 fl    MCH 26.1 (L) 26.5 - 33.0 pg    MCHC 33.3 31.5 - 36.5 g/dL    RDW 13.6 10.0 - 15.0 %    Platelet Count 172 150 - 450 10e9/L    Diff Method Automated Method     % Neutrophils 90.4 %    % Lymphocytes 3.4 %    % Monocytes 5.7 %    % Eosinophils 0.0 %    % Basophils 0.1 %    % Immature Granulocytes 0.4 %    Absolute Neutrophil 15.4 (H) 1.6 - 8.3 10e9/L    Absolute Lymphocytes 0.6 (L) 0.8 - 5.3 10e9/L    Absolute Monocytes 1.0 0.0 - 1.3 10e9/L    Absolute Eosinophils 0.0 0.0 - 0.7 10e9/L    Absolute Basophils 0.0 0.0 - 0.2 10e9/L    Abs Immature Granulocytes 0.1 0 - 0.4 10e9/L   INR   Result Value Ref Range    INR 1.16 0 - 1.3   Troponin GH (now)   Result Value Ref Range    Troponin 1,057.7 (HH) <34.0 pg/mL   Comprehensive metabolic panel   Result Value Ref Range    Sodium 127 (L) 134 - 144 mmol/L    Potassium 2.7 (L) 3.5 - 5.1 mmol/L    Chloride 91 (L) 98 - 107 mmol/L    Carbon Dioxide 22 21 - 31 mmol/L    Anion Gap 14 3 - 14 mmol/L    Glucose 223 (H) 70 - 105 mg/dL    Urea Nitrogen 10 7 - 25 mg/dL    Creatinine 0.61 0.60 - 1.20 mg/dL    GFR Estimate >90 >60 mL/min/[1.73_m2]    GFR Estimate If  Black >90 >60 mL/min/[1.73_m2]    Calcium 9.0 8.6 - 10.3 mg/dL    Bilirubin Total 0.9 0.3 - 1.0 mg/dL    Albumin 4.6 3.5 - 5.7 g/dL    Protein Total 7.2 6.4 - 8.9 g/dL    Alkaline Phosphatase 59 34 - 104 U/L    ALT 39 7 - 52 U/L    AST 41 (H) 13 - 39 U/L   XR Chest Port 1 View    Narrative    XR CHEST PORT 1 VW    HISTORY: 79 yearsFemale aspiration    TECHNIQUE: A single view of the chest was performed    COMPARISON: None    FINDINGS: Heart size and pulmonary vascularity are within normal  limits. Lungs are clear. No consolidating airspace opacities are  present.        Impression    IMPRESSION: Clear chest    RINA CHARLES MD       Medications - No data to display    Assessments & Plan (with Medical Decision Making)     I have reviewed the nursing notes.    I have reviewed the findings, diagnosis, plan and need for follow up with the patient.  Patient's care was discussed with Dr. Ruiz on-call hospitalist was also patient's son.  Patient's son wish no aggressive measures done unless patient's life could be returned to normal.  Patient's care was discussed with neurosurgeon Dr. Garcia from [facility].  Is felt at best she could be offered will be a persistent vegetative state.  With this information patient's son again stated that patient would be DNR/DNI.  Patient will be admitted for comfort care only.  Critical care time was 45 minutes.  New Prescriptions    No medications on file       Final diagnoses:   Intracranial hemorrhage (H)   Acute myocardial infarction, initial episode of care (H)       1/29/2020   [facility] AND [facility]     Phoenix Monroy MD  01/29/20 [id number]       Phoenix Monroy MD  01/29/20 [id number]

## 2020-01-30 NOTE — PROGRESS NOTES
Time of death 1740. No breath sounds, lung sounds or pulse. Confirmed with second RN, Elizabeth. MD notified.     Family would like to use [facility]  home.     Alessandro Quach RN 20 5:45 PM

## 2020-01-30 NOTE — PROGRESS NOTES
[facility]    Hospitalist Progress Note      Assessment & Plan   Annie Joy is a 79 year old female who was admitted on 1/29/2020.       Obstructive hydrocephalus    Assessment: Acute, severe.  Patient is terminal and expect her to pass away within 24 to 48 hours.      Plan: Contrinue comfort care, morphine and Ativan as needed.      Hemorrhagic stroke with coma    Assessment: Acute, severe.  GCS 3. Patient is terminal and expect her to pass away within 24 to 48 hours.       ICH (intracerebral hemorrhage) (H)    Assessment: Acute, severe.  Patient is terminal and expect her to pass away within 24 to 48 hours.       Active Problems:    Pulmonary emphysema (H)       Hypertension    DVT Prophylaxis: comfort care  Code Status: DNR/DNI    Uvaldo Ruiz    Interval History   Unresponsive    -Data reviewed today: I reviewed all new labs and imaging results over the last 24 hours. I personally reviewed no images or EKG's today.    Physical Exam   Temp: 96.2  F (35.7  C) Temp src: Tympanic BP: (!) 153/84 Pulse: 56 Heart Rate: 56 Resp: (!) 32 SpO2: 96 % O2 Device: None (Room air)    There were no vitals filed for this visit.  Vital Signs with Ranges  Temp:  [96.2  F (35.7  C)-101.2  F (38.4  C)] 96.2  F (35.7  C)  Pulse:  [56-60] 56  Heart Rate:  [56-59] 56  Resp:  [18-32] 32  BP: (153-181)/(83-91) 153/84  SpO2:  [96 %] 96 %  I/O last 3 completed shifts:  In: -   Out: 1050 [Urine:1050]    GENERAL: Comfortable, no apparent distress. Increased respiratory rate.  CARDIOVASCULAR: regular rate and rhythm, no murmur. No lower extremity edema   RESPIRATORY: Clear to auscultation bilaterally, no wheezes or crackles.  GI: non-tender, non-distended, normal bowel sounds.   SKIN: warm periphery      Medications         Data   Recent Labs   Lab 01/29/20  1258   WBC 17.0*   HGB 14.9   MCV 79      INR 1.16   *   POTASSIUM 2.7*   CHLORIDE 91*   CO2 22   BUN 10   CR 0.61   ANIONGAP 14   CAROLE 9.0   GLC  223*   ALBUMIN 4.6   PROTTOTAL 7.2   BILITOTAL 0.9   ALKPHOS 59   ALT 39   AST 41*       Recent Results (from the past 24 hour(s))   CT Head w/o Contrast   Result Value    Radiologist flags Acute intracranial hemorrhage (AA)    Narrative    PROCEDURE: CT HEAD W/O CONTRAST     HISTORY: Altered level of consciousness (LOC), unexplained.    COMPARISON: None.    TECHNIQUE:  Helical images of the head from the foramen magnum to the  vertex were obtained without contrast.    FINDINGS: There is acute left inferior frontal parenchymal hemorrhage  spanning 6.2 x 3.4 x 1.8 cm.     There is acute intraventricular hemorrhage with casting of all 4  ventricles. Heterogeneous attenuation may reflect ongoing hemorrhage  or failure of coagulation. There is acute obstructive hydrocephalus.  There is evidence of downward herniation with crowding at the foramen  magnum.    There is extensive subarachnoid hemorrhage with casting of the  suprasellar, quadrigeminal and prepontine cisterns as well as the  sylvian fissures.    Edema and possible transcortical ischemia is seen in the inferior  frontal lobes area there is diffuse white matter hypoattenuation.    The calvarium is intact.      Impression    IMPRESSION: Extensive acute intracranial hemorrhage.     Large intraparenchymal hemorrhage within the left inferior frontal  lobe.     Extensive intraventricular hemorrhage, with heterogeneous attenuation  potentially reflecting ongoing bleeding or failure of coagulation.  Acute obstructive hydrocephalus. Downward herniation with effacement  of the CSF the level of the foramen magnum.      Large volume diffuse subarachnoid hemorrhage.    [Critical Result: Acute intracranial hemorrhage]    Finding was identified on 1/29/2020 12:59 PM.     Dr. Rangel was contacted by me on 1/29/2020 1:00 PM and verbalized  understanding of the critical result.     CAT RANDHAWA MD   XR Chest Port 1 View    Narrative    XR CHEST PORT 1 VW    HISTORY: 79  yearsFemale aspiration    TECHNIQUE: A single view of the chest was performed    COMPARISON: None    FINDINGS: Heart size and pulmonary vascularity are within normal  limits. Lungs are clear. No consolidating airspace opacities are  present.        Impression    IMPRESSION: Clear chest    RINA CHARLES MD

## 2020-01-30 NOTE — PLAN OF CARE
Resting quietly in bed, RR 32-36, labored breathing with audible copious tracheal secretions not visible in oral cavity, temp 99.6, skin is warm and intact.     Karlie Leyva RN on 1/30/2020 at 1:18 PM

## 2020-01-30 NOTE — PLAN OF CARE
Continues to appear comfortable, skin is intact, RR ranges between 32-38, quality and sound are the same, 5-10 second periods of apnea. Continue to turn and position  and oral cares Q2h to promote comfort.     Karlie Leyva RN on 1/30/2020 at 3:55 PM

## 2020-01-30 NOTE — PLAN OF CARE
Pt unresponsive. No signs of pain observed when repositioning or reported by family. Atropine drops administered for respiratory secretions. Pt repositioned q2hrs. Family at bedside Temp 99.6  F (37.6  C) (Tympanic)   Resp 24 Violetta Aggarwal RN on 1/30/2020 at 5:56 AM

## 2020-01-31 PROCEDURE — 99238 HOSP IP/OBS DSCHRG MGMT 30/<: CPT | Performed by: INTERNAL MEDICINE

## 2020-01-31 ASSESSMENT — ACTIVITIES OF DAILY LIVING (ADL): ADLS_ACUITY_SCORE: 13

## 2020-01-31 NOTE — DISCHARGE SUMMARY
[facility]    Death Summary  Hospitalist    Date of Admission:  1/29/2020  Date of Death:         1/31/2020  1:37 AM  Provider Completing Death Summary: Uvaldo Ruiz    Discharge Diagnoses     Obstructive hydrocephalus    Hemorrhagic stroke with coma    ICH (intracerebral hemorrhage) (H)  Active Problems:    Pulmonary emphysema (H)    Hypertension      History of Present Illness   Annie Joy is an 79 year old female who presented with unresponsiveness due to catastrophic hemorrhagic stroke.     Hospital Course   Annie Joy was admitted on 1/29/2020.  The following problems were addressed during her hospitalization:    Stroke with coma  She was admitted on comfort care and passed sung    Cause of death: Hemorrhagic stroke with coma    Uvaldo Ruiz MD    Primary Care Physician   Physician No Ref-Primary    Consultations This Hospital Stay   SOCIAL WORK IP CONSULT    Time Spent on this Encounter   I, Uvaldo Ruiz MD, did not see the patient before she left the hospital.    Data   Most Recent 3 CBC's:  Recent Labs   Lab Test 01/29/20  1258 07/18/19  1441 10/30/17  1444   WBC 17.0* 6.8  --    HGB 14.9 15.1 15.1   MCV 79 80 80    184 191      Most Recent 3 BMP's:  Recent Labs   Lab Test 01/29/20  1258 07/18/19  1441 10/30/17  1506   * 131* 135   POTASSIUM 2.7* 3.5 4.4   CHLORIDE 91* 94* 99   CO2 22 29 27   BUN 10 7 11   CR 0.61 0.78 0.80   ANIONGAP 14 8  --    CAROLE 9.0 9.6 9.5   * 116* 101     Most Recent 2 LFT's:  Recent Labs   Lab Test 01/29/20  1258 07/18/19  1441   AST 41* 19   ALT 39 8   ALKPHOS 59 57   BILITOTAL 0.9 0.9     Most Recent INR's and Anticoagulation Dosing History:  Anticoagulation Dose History     Recent Dosing and Labs Latest Ref Rng & Units 1/29/2020    INR 0 - 1.3 1.16        Most Recent 3 Troponin's:No lab results found.  Most Recent Cholesterol Panel:  Recent Labs   Lab Test 03/18/16  1444   *   HDL 70   TRIG 78     Most Recent 6  Bacteria Isolates From Any Culture (See EPIC Reports for Culture Details):No lab results found.  Most Recent TSH, T4 and A1c Labs:No lab results found.  Results for orders placed or performed during the hospital encounter of 01/29/20   CT Head w/o Contrast     Value    Radiologist flags Acute intracranial hemorrhage (AA)    Narrative    PROCEDURE: CT HEAD W/O CONTRAST     HISTORY: Altered level of consciousness (LOC), unexplained.    COMPARISON: None.    TECHNIQUE:  Helical images of the head from the foramen magnum to the  vertex were obtained without contrast.    FINDINGS: There is acute left inferior frontal parenchymal hemorrhage  spanning 6.2 x 3.4 x 1.8 cm.     There is acute intraventricular hemorrhage with casting of all 4  ventricles. Heterogeneous attenuation may reflect ongoing hemorrhage  or failure of coagulation. There is acute obstructive hydrocephalus.  There is evidence of downward herniation with crowding at the foramen  magnum.    There is extensive subarachnoid hemorrhage with casting of the  suprasellar, quadrigeminal and prepontine cisterns as well as the  sylvian fissures.    Edema and possible transcortical ischemia is seen in the inferior  frontal lobes area there is diffuse white matter hypoattenuation.    The calvarium is intact.      Impression    IMPRESSION: Extensive acute intracranial hemorrhage.     Large intraparenchymal hemorrhage within the left inferior frontal  lobe.     Extensive intraventricular hemorrhage, with heterogeneous attenuation  potentially reflecting ongoing bleeding or failure of coagulation.  Acute obstructive hydrocephalus. Downward herniation with effacement  of the CSF the level of the foramen magnum.      Large volume diffuse subarachnoid hemorrhage.    [Critical Result: Acute intracranial hemorrhage]    Finding was identified on 1/29/2020 12:59 PM.     Dr. Rangel was contacted by me on 1/29/2020 1:00 PM and verbalized  understanding of the critical result.      CAT RANDHAWA MD   XR Chest Port 1 View    Narrative    XR CHEST PORT 1 VW    HISTORY: 79 yearsFemale aspiration    TECHNIQUE: A single view of the chest was performed    COMPARISON: None    FINDINGS: Heart size and pulmonary vascularity are within normal  limits. Lungs are clear. No consolidating airspace opacities are  present.        Impression    IMPRESSION: Clear chest    RINA CHARLES MD

## 2020-01-31 NOTE — PROGRESS NOTES
House Supervisor, LEESA, transported patient to [facility]. Giovanna Mcpherson RN on 1/31/2020 at 1:36 AM

## 2024-09-05 NOTE — PROGRESS NOTES
Patient Information     Patient Name MRN Sex Annie Ram [id number] Female 1940      Progress Notes by Rosalina Clancy MD at 10/30/2017  1:45 PM     Author:  Rosalina Clancy MD Service:  (none) Author Type:  Physician     Filed:  10/30/2017  3:09 PM Encounter Date:  10/30/2017 Status:  Signed     :  Rosalina Clancy MD (Physician)            SUBJECTIVE:    Annie Joy is a 77 y.o. female who presents for medication management visit and refill of meds. She insists that she is taking her blood pressure medications as prescribed and has been hypertensive on all visits here. She does not check her blood pressure outside the clinic setting and has no plans to do so.    Mammography discussed and she has breast implants were placed in her mid 30s with last mammogram about 6 years ago. She will schedule mammogram if she desires.    HPI    Allergies      Allergen   Reactions     Pollen Extracts  Atopic Dermatitis     Propoxyphene  Atopic Dermatitis     Unlisted Allergen (Include Detail In Comments)  Vomiting and Respiratory Distress     Crab meat    ,   Current Outpatient Prescriptions:      aspirin enteric coated 81 mg tablet, Take 1 tablet by mouth once daily with a meal., Disp: , Rfl: 0     calcium 600 mg capsule, Take 1 capsule by mouth once daily., Disp: , Rfl: 0     escitalopram oxalate (LEXAPRO) 10 mg tablet, Take 1 tablet by mouth once daily., Disp: 90 tablet, Rfl: 4     hydroCHLOROthiazide 12.5 mg capsule, Take 1 capsule by mouth once daily., Disp: 90 capsule, Rfl: 4     lisinopril (PRINIVIL; ZESTRIL) 20 mg tablet, Take 1 tablet by mouth once daily., Disp: 90 tablet, Rfl: 4     metoprolol tartrate (LOPRESSOR) 50 mg tablet, Take 2 tablets in am and 2 in the evening., Disp: 360 tablet, Rfl: 4     simvastatin (ZOCOR) 20 mg tablet, Take 1 tablet by mouth at bedtime., Disp: 90 tablet, Rfl: 4  Medications have been reviewed by me and are current to the best of my knowledge and ability. and  Patient had MRI completed at [facility] 8/28. I called and got patient scheduled 9/10 @ 10:45.     Patient Active Problem List       Diagnosis  Date Noted     H/O breast implant  10/30/2017     History of bladder cancer, LGTa, 11/2015 11/18/2015     Full dentures  11/06/2013     CHEST PAIN, ATYPICAL  02/24/2012     ANXIETY DISORDER, GENERALIZED       HYPERTENSION  09/25/2007     OBSTRUCTIVE CHRONIC BRONCHITIS WITHOUT EXACERBAT  09/25/2007     EMPHYSEMA  09/25/2007     OSTEOPOROSIS  07/31/2006     DEXA 2011        PURE HYPERCHOLESTEROLEMIA  05/22/2000     TOBACCO USER  05/22/2000     Social History     Social History        Marital status:       Spouse name: Pardeep     Number of children:  4     Years of education:  N/A     Occupational History      Not on file.     Social History Main Topics        Smoking status:  Current Every Day Smoker     Packs/day: 1.00     Years: 60.00     Types: Cigarettes     Smokeless tobacco:  Never Used     Alcohol use  No     Drug use:  No     Sexual activity:  Not Currently     Other Topics  Concern      Service No     Caffeine Concern No     Occupational Exposure No     Hobby Hazards No     Sleep Concern No     Stress Concern No     Weight Concern No     Special Diet No     Exercise No     Seat Belt Yes     Self-Exams Yes     Social History Narrative      for 50 years 2011    Children: Jasvir Najera  1957, [place]pavaEstrella [place], 1961, [place](adopted out and then found her 2002)                   Ambrosio Joy 1964, [place], multiple DUIs                   Enoc Joy  1966, [facility] for drug sale and possession    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      Enjoys visiting, photography, painting, writing.      Lives with her . He has had stroke but still drives.                                  REVIEW OF SYSTEMS:  Review of Systems   Constitutional: Negative.    HENT:        Mild subjective hearing loss   Cardiovascular: Negative for palpitations, leg swelling and PND.        Gets short of breath quite  "easily due to her continued smoking use. She denies chest pain.   Skin: Negative.    Psychiatric/Behavioral: Negative.        OBJECTIVE:  BP (!) 179/103  Pulse 62  Ht 1.68 m (5' 6.14\")  Wt 74.2 kg (163 lb 9.6 oz)  BMI 26.29 kg/m2    EXAM:   Physical Exam   Constitutional: She is well-developed, well-nourished, and in no distress. No distress.   Cardiovascular: Normal rate, regular rhythm and normal heart sounds.    No murmur heard.  Pulmonary/Chest:   Diffusely diminished breath sounds without rales or rhonchi.   Neurological: She is alert.   Skin: Skin is warm.   Psychiatric: Affect normal.   Nursing note and vitals reviewed.      ASSESSMENT/PLAN:    ICD-10-CM    1. Medication management Z79.899 TSH      TSH   2. Depression with anxiety F41.8 escitalopram oxalate (LEXAPRO) 10 mg tablet   3. Hypertension I10 hydroCHLOROthiazide 12.5 mg capsule      lisinopril (PRINIVIL; ZESTRIL) 20 mg tablet      metoprolol tartrate (LOPRESSOR) 50 mg tablet      COMPLETE METABOLIC PANEL      CBC WITH DIFFERENTIAL      TSH      COMPLETE METABOLIC PANEL      CBC WITH DIFFERENTIAL      TSH      CBC WITH AUTO DIFFERENTIAL   4. Pure hypercholesterolemia E78.00 simvastatin (ZOCOR) 20 mg tablet   5. Encounter for immunization Z23 FLU VACCINE => 65 YRS HIGH DOSE TRIVALENT IIV3 IM      [place] ADMIN VACC INITIAL   6. H/O breast implant Z98.82 XR MAMMO BILAT SCREENING   7. Encounter for screening mammogram for malignant neoplasm of breast  Z12.31 XR MAMMO BILAT SCREENING        Plan:    Persistent tobacco use and has no plans to quit.  Influenza vaccine requested and given.  Labs pending.  Refills completed for a year. She insists she is taking blood pressure medications as prescribed and no changes are made. Suspect she has significant degree of peripheral vascular disease.  Mammogram at her discretion to  Rosalina Clancy MD  3:08 PM 10/30/2017   I spent approximately 25 minutes with the patient (exclusive of separately billed " services/procedures), with greater than 50% spent in Counseling, Prognosis, Risks and benefits of management or follow-up, Importance of compliance with chosen management options, Instructions of management (treatment) and/or follow-up, Risk factor reduction and Patient education and Coordinating Care, Establishing and/or reviewing the patient's medical record.    Portions of this dictation were created using the Dragon Nuance voice recognition system. Proofreading was completed but there may be errors in text.